# Patient Record
Sex: FEMALE | Race: WHITE | NOT HISPANIC OR LATINO | Employment: STUDENT | URBAN - METROPOLITAN AREA
[De-identification: names, ages, dates, MRNs, and addresses within clinical notes are randomized per-mention and may not be internally consistent; named-entity substitution may affect disease eponyms.]

---
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
## 2020-07-23 ENCOUNTER — TELEPHONE (OUTPATIENT)
Dept: OBSTETRICS AND GYNECOLOGY | Facility: CLINIC | Age: 20
End: 2020-07-23

## 2020-07-23 DIAGNOSIS — Z00.00 ROUTINE HEALTH MAINTENANCE: Primary | ICD-10-CM

## 2020-07-23 NOTE — TELEPHONE ENCOUNTER
Varicella IGG lab work is needed for school.  She doesn't have a current PCP, is there anyway she can get this done through us.    P.S. This is Nimisha's daughters. :)    Could you call Nimisha - [phone]

## 2020-07-28 LAB — VZV IGG SER IA-ACNC: 890 INDEX

## 2020-07-29 ENCOUNTER — TELEPHONE (OUTPATIENT)
Dept: OBSTETRICS AND GYNECOLOGY | Facility: CLINIC | Age: 20
End: 2020-07-29

## 2020-07-29 NOTE — TELEPHONE ENCOUNTER
----- Message from Donna Cantu MD sent at 7/29/2020 12:13 PM EDT -----  Please call patient and let her know that her test results show she is varicella immune    07/29/20   Pt has been informed that she is varicella immune

## 2020-10-27 ENCOUNTER — OFFICE VISIT (OUTPATIENT)
Dept: OBSTETRICS AND GYNECOLOGY | Facility: CLINIC | Age: 20
End: 2020-10-27

## 2020-10-27 VITALS
HEART RATE: 76 BPM | HEIGHT: 64 IN | WEIGHT: 147 LBS | BODY MASS INDEX: 25.1 KG/M2 | DIASTOLIC BLOOD PRESSURE: 79 MMHG | SYSTOLIC BLOOD PRESSURE: 124 MMHG

## 2020-10-27 DIAGNOSIS — Z01.419 WELL WOMAN EXAM WITH ROUTINE GYNECOLOGICAL EXAM: Primary | ICD-10-CM

## 2020-10-27 PROCEDURE — 99395 PREV VISIT EST AGE 18-39: CPT | Performed by: OBSTETRICS & GYNECOLOGY

## 2020-10-27 RX ORDER — LEVONORGESTREL / ETHINYL ESTRADIOL AND ETHINYL ESTRADIOL 150-30(84)
1 KIT ORAL DAILY
Qty: 91 EACH | Refills: 4 | Status: SHIPPED | OUTPATIENT
Start: 2020-10-27 | End: 2021-11-19 | Stop reason: SDUPTHER

## 2020-10-27 NOTE — PROGRESS NOTES
Subjective   Kary Kern is a 20 y.o. female   Chief Complaint   Patient presents with   • Annual Exam      History of Present Illness  Kary Kern is a 20 y.o.  who presents for an annual examination   Pap history:  Last pap: N/A  Prior abnormal paps: no  STDs  Sexually active: no  History of STDs: no  Has had HPV vaccine: unknown  Contraception:  OCP - for menstrual migraines; on Seasonique; desires to continue  Had one migraine episode in the last three weeks thinks due to stressful chemistry class    History of physical abuse: no, History of sexual abuse: no and History of verbal/emotional abuse: no    Screening for BRCA-   No known risk factors for BRCA    Past Medical History:   Diagnosis Date   • Migraines      Past Surgical History:   Procedure Laterality Date   • KNEE SURGERY       Social History     Tobacco Use   • Smoking status: Never Smoker   • Smokeless tobacco: Never Used   Substance Use Topics   • Alcohol use: No   • Drug use: No     Family History   Problem Relation Age of Onset   • Migraines Mother    • Hypertension Mother    • Hypertension Father    • Parkinsonism Maternal Grandmother    • Diabetes Maternal Grandmother    • Hypertension Maternal Grandmother    • Coronary artery disease Maternal Grandmother    • Heart attack Maternal Grandmother    • Deep vein thrombosis Maternal Grandmother         smoking, in her 60's   • Hypertension Maternal Grandfather    • Hypertension Paternal Grandmother    • Diabetes Paternal Grandfather    • Hypertension Paternal Grandfather    • Breast cancer Other         fifties   • Ovarian cancer Neg Hx    • Uterine cancer Neg Hx    • Colon cancer Neg Hx    • Pulmonary embolism Neg Hx      Current Outpatient Medications on File Prior to Visit   Medication Sig Dispense Refill   • Levonorgest-Eth Estrad -Day 0.15-0.03 &0.01 MG tablet Take 1 tablet by mouth Daily. 91 each 4   • naproxen sodium (ALEVE) 220 MG tablet Take 220 mg by mouth.       No current  "facility-administered medications on file prior to visit.      Allergies   Allergen Reactions   • Amoxicillin Rash    Review of Systems   Constitutional: Negative for chills, fever and unexpected weight change.   HENT: Negative for congestion, nosebleeds and sore throat.    Eyes: Negative for visual disturbance.   Respiratory: Negative for cough, chest tightness and shortness of breath.    Cardiovascular: Negative for chest pain and palpitations.   Gastrointestinal: Negative for abdominal pain, constipation, diarrhea, nausea and vomiting.   Endocrine: Negative for polyuria.   Genitourinary: Negative for difficulty urinating, dyspareunia, dysuria, frequency, genital sores, hematuria, menstrual problem, pelvic pain, urgency, vaginal bleeding, vaginal discharge and vaginal pain.   Musculoskeletal: Negative for arthralgias and joint swelling.   Skin: Negative for color change and rash.   Neurological: Negative for dizziness, light-headedness and headaches.   Hematological: Does not bruise/bleed easily.   Psychiatric/Behavioral: Negative for dysphoric mood. The patient is not nervous/anxious.        Objective    /79   Pulse 76   Ht 162.6 cm (64\")   Wt 66.7 kg (147 lb)   Breastfeeding No   BMI 25.23 kg/m²    Physical Exam   Constitutional: She is oriented to person, place, and time. She appears well-developed. No distress.   HENT:   Head: Normocephalic and atraumatic.   Neck: No tracheal deviation present. No thyromegaly present.   Cardiovascular: Normal rate, regular rhythm and normal heart sounds. Exam reveals no gallop and no friction rub.   No murmur heard.  Pulmonary/Chest: Effort normal and breath sounds normal. No respiratory distress. She has no wheezes. She has no rales. She exhibits no tenderness. Right breast exhibits no inverted nipple, no mass, no nipple discharge, no skin change and no tenderness. Left breast exhibits no inverted nipple, no mass, no nipple discharge, no skin change and no " tenderness.   Abdominal: Soft. She exhibits no distension and no mass. There is no abdominal tenderness.   Genitourinary: There is no rash, lesion or injury on the right labia. There is no rash, lesion or injury on the left labia. Left adnexum displays no mass.    Genitourinary Comments: Internal exam deferred     Musculoskeletal: Normal range of motion. No deformity.   Lymphadenopathy:     She has no cervical adenopathy.   Neurological: She is alert and oriented to person, place, and time.   Skin: Skin is warm and dry. No rash noted. She is not diaphoretic.   Psychiatric: Her behavior is normal. Judgment and thought content normal.         Assessment/Plan   Diagnoses and all orders for this visit:    1. Well woman exam with routine gynecological exam (Primary)    Well woman counseling/screening:    Cervical cancer screening:    Reports no h/o cervical dysplasia   HPV vaccination recommended    The patient is due for a pap at age 21.    Screening guidelines discussed with patient  Breast cancer screening:    Clinical breast exam recommended for age 20-39 years every 1-3 years   Mammogram recommended starting age 40, Discussed risks and benefits of earlier versus later screening   Breast self awareness encouraged  STD Screening   Declines, sexually naive  Contraception :   Desires to continue oral contraceptive pill    No side effects. Has not had a migraine recently.    Family history    does not demonstrate need for genetics referral   Healthy lifestyle counseling:   Patient was counseled on    Body mass index is 25.23 kg/m².

## 2020-11-23 ENCOUNTER — TELEPHONE (OUTPATIENT)
Dept: OBSTETRICS AND GYNECOLOGY | Facility: CLINIC | Age: 20
End: 2020-11-23

## 2020-11-23 NOTE — TELEPHONE ENCOUNTER
----- Message from Felisha Ortiz MD sent at 11/20/2020  3:09 PM EST -----  Dr. Cantu sent prescription on 10/27/20. Please check and see if they have it.  Thanks,    Erin   ----- Message -----  From: Mariam Nagy  Sent: 11/20/2020  10:09 AM EST  To: Donna Cantu MD    REFILL REQUEST DELON

## 2021-11-19 ENCOUNTER — TELEPHONE (OUTPATIENT)
Dept: OBSTETRICS AND GYNECOLOGY | Facility: CLINIC | Age: 21
End: 2021-11-19

## 2021-11-19 RX ORDER — LEVONORGESTREL / ETHINYL ESTRADIOL AND ETHINYL ESTRADIOL 150-30(84)
1 KIT ORAL DAILY
Qty: 91 EACH | Refills: 0 | Status: SHIPPED | OUTPATIENT
Start: 2021-11-19 | End: 2022-02-21 | Stop reason: SDUPTHER

## 2021-11-19 NOTE — TELEPHONE ENCOUNTER
Pepe pt called to request 91 day refill on Levonorgest-Eth Estrad 91-Day 0.15-0.03 &0.01 MG tablet, to be sent to [facility].  Annual scheduled for 1/22/21.    Pt # [phone]

## 2022-02-16 ENCOUNTER — OFFICE VISIT (OUTPATIENT)
Dept: FAMILY MEDICINE CLINIC | Facility: CLINIC | Age: 22
End: 2022-02-16

## 2022-02-16 VITALS
SYSTOLIC BLOOD PRESSURE: 118 MMHG | HEIGHT: 64 IN | BODY MASS INDEX: 24.75 KG/M2 | WEIGHT: 145 LBS | DIASTOLIC BLOOD PRESSURE: 64 MMHG | HEART RATE: 83 BPM | OXYGEN SATURATION: 98 %

## 2022-02-16 DIAGNOSIS — R53.83 OTHER FATIGUE: ICD-10-CM

## 2022-02-16 DIAGNOSIS — Z00.00 ANNUAL PHYSICAL EXAM: Primary | ICD-10-CM

## 2022-02-16 DIAGNOSIS — Z11.59 NEED FOR HEPATITIS C SCREENING TEST: ICD-10-CM

## 2022-02-16 PROCEDURE — 99385 PREV VISIT NEW AGE 18-39: CPT

## 2022-02-16 NOTE — PROGRESS NOTES
Subjective   Kary Kern is a 21 y.o. female. Presents today as a New patient here to establish care and for annual check up     Chief Complaint   Patient presents with   • Annual Exam     new patient estab care and school       History of Present Illness    Previous PCP: Pediatrician Dr Jovi montoya  Significant medical hx: none   Significant family hx: Parents (HTN); Grandmother (DM2); Grandfather (heart disease, DM2)    Denies Smoking, alcohol, drug use  No Hx STD-   Currently Sexually active- monogamous relationship - on OCP- doing well  Pap: has not had one. Scheduled for April.     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   Routine exercise. Goes to gym    Fatigue: States has been more fatigued than usual. No associated symptoms. Has been eating well and exercising. Started vitamin B 12 to see if helps. Would like lab work.    No other concerns today.   Review of Systems   Constitutional: Positive for fatigue. Negative for appetite change, chills and fever.   Eyes: Negative for visual disturbance.   Respiratory: Negative.    Cardiovascular: Negative.    Endocrine: Negative.    Psychiatric/Behavioral: Negative for sleep disturbance.       Patient Active Problem List   Diagnosis   • Encounter for gynecological examination without abnormal finding   • Patellar tendinitis of both knees     Past Medical History:   Diagnosis Date   • Migraines      Past Surgical History:   Procedure Laterality Date   • KNEE SURGERY       Family History   Problem Relation Age of Onset   • Migraines Mother    • Hypertension Mother    • Hypertension Father    • Parkinsonism Maternal Grandmother    • Diabetes Maternal Grandmother    • Hypertension Maternal Grandmother    • Coronary artery disease Maternal Grandmother    • Heart attack Maternal Grandmother    • Deep vein thrombosis Maternal Grandmother         smoking, in her 60's   •  "Hypertension Maternal Grandfather    • Hypertension Paternal Grandmother    • Diabetes Paternal Grandfather    • Hypertension Paternal Grandfather    • Breast cancer Other         fifties   • Ovarian cancer Neg Hx    • Uterine cancer Neg Hx    • Colon cancer Neg Hx    • Pulmonary embolism Neg Hx      Social History     Socioeconomic History   • Marital status: Single   Tobacco Use   • Smoking status: Never Smoker   • Smokeless tobacco: Never Used   Substance and Sexual Activity   • Alcohol use: No   • Drug use: No   • Sexual activity: Never     Birth control/protection: None, OCP       Allergies   Allergen Reactions   • Amoxicillin Rash       Current Outpatient Medications on File Prior to Visit   Medication Sig Dispense Refill   • Levonorgest-Eth Estrad 91-Day 0.15-0.03 &0.01 MG tablet Take 1 tablet by mouth Daily. 91 each 0   • spironolactone (ALDACTONE) 25 MG tablet Take 3 tablets by mouth every night at bedtime with food 90 tablet 1   • [DISCONTINUED] naproxen sodium (ALEVE) 220 MG tablet Take 220 mg by mouth.     • [DISCONTINUED] spironolactone (ALDACTONE) 25 MG tablet Take 3 tablets by mouth every night with food 90 tablet 1   • [DISCONTINUED] spironolactone (ALDACTONE) 25 MG tablet Take 3 tablets by mouth every night at bedtime. 90 tablet 5     No current facility-administered medications on file prior to visit.       Objective   Vitals:    02/16/22 1540   BP: 118/64   Pulse: 83   SpO2: 98%   Weight: 65.8 kg (145 lb)   Height: 162.6 cm (64\")   PainSc: 0-No pain     Body mass index is 24.89 kg/m².  Physical Exam  Constitutional:       Appearance: Normal appearance.   Eyes:      Extraocular Movements: Extraocular movements intact.      Conjunctiva/sclera: Conjunctivae normal.      Pupils: Pupils are equal, round, and reactive to light.   Neck:      Thyroid: No thyroid mass, thyromegaly or thyroid tenderness.      Vascular: No carotid bruit.   Cardiovascular:      Rate and Rhythm: Normal rate and regular rhythm. "      Pulses: Normal pulses.      Heart sounds: Normal heart sounds, S1 normal and S2 normal. No murmur heard.      Pulmonary:      Effort: Pulmonary effort is normal. No respiratory distress.      Breath sounds: Normal breath sounds.   Musculoskeletal:      Cervical back: Neck supple. No tenderness.      Right lower leg: No edema.      Left lower leg: No edema.   Lymphadenopathy:      Cervical: No cervical adenopathy.   Neurological:      General: No focal deficit present.      Mental Status: She is alert and oriented to person, place, and time.   Psychiatric:         Attention and Perception: Attention normal.         Mood and Affect: Mood normal.         Speech: Speech normal.         Behavior: Behavior normal.         Thought Content: Thought content normal.         Cognition and Memory: Cognition normal.         Judgment: Judgment normal.         Assessment/Plan   Diagnoses and all orders for this visit:    1. Annual physical exam (Primary)  -     CBC & Differential  -     Comprehensive Metabolic Panel  -     Hepatitis C Antibody  -     TSH  -     Tdap due- plans to get tomorrow at work- work in [facility]        -     Eat a healthy diet and exercise routinely. Avoid smoking/alcohol and drugs. Use seatbelt 100% of time. Advised dental exam every 6-12 months.     2. Need for hepatitis C screening test  -     Hepatitis C Antibody    3. Other fatigue  -     Vitamin D 25 Hydroxy  -     Vitamin B12  -     TSH         -Follow up: 12 months, sooner as needed.    - Pt agrees with plan of care and states no further concerns or questions today      This document is intended for medical expert use only. Reading of this document by patients and/or patient's family without participating medical staff guidance may result in misinterpretation and unintended morbidity.  Any interpretation of such data is the responsibility of the patient and/or family member responsible for the patient in concert with their primary or specialist  providers, not to be left for sources of online searches such as [employer], [employer] or similar queries. Relying on these approaches to knowledge may result in misinterpretation, misguided goals of care and even death should patients or family members try recommendations outside of the realm of professional medical care in a supervised way.     Please allow 3-5 business days for recommendations based on new results     Go to the ER for any possible lifethreatening symptoms such as chest pain or shortness of air.      I personally spent 20 minutes with this patient, preparing for the visit, reviewing tests, obtaining and/or reviewing a separately obtained history, performing a medically appropriate examination and/or evaluation , counseling and educating the patient/family/caregiver, ordering medications, tests, or procedures, documenting information in the medical record and independently interpreting results.

## 2022-02-18 LAB
25(OH)D3+25(OH)D2 SERPL-MCNC: 31.3 NG/ML (ref 30–100)
ALBUMIN SERPL-MCNC: 4.4 G/DL (ref 3.9–5)
ALBUMIN/GLOB SERPL: 1.8 {RATIO} (ref 1.2–2.2)
ALP SERPL-CCNC: 47 IU/L (ref 44–121)
ALT SERPL-CCNC: 19 IU/L (ref 0–32)
AST SERPL-CCNC: 26 IU/L (ref 0–40)
BASOPHILS # BLD AUTO: 0.1 X10E3/UL (ref 0–0.2)
BASOPHILS NFR BLD AUTO: 1 %
BILIRUB SERPL-MCNC: 0.3 MG/DL (ref 0–1.2)
BUN SERPL-MCNC: 12 MG/DL (ref 6–20)
BUN/CREAT SERPL: 15 (ref 9–23)
CALCIUM SERPL-MCNC: 9.3 MG/DL (ref 8.7–10.2)
CHLORIDE SERPL-SCNC: 100 MMOL/L (ref 96–106)
CO2 SERPL-SCNC: 22 MMOL/L (ref 20–29)
CREAT SERPL-MCNC: 0.82 MG/DL (ref 0.57–1)
EOSINOPHIL # BLD AUTO: 0.1 X10E3/UL (ref 0–0.4)
EOSINOPHIL NFR BLD AUTO: 1 %
ERYTHROCYTE [DISTWIDTH] IN BLOOD BY AUTOMATED COUNT: 11.7 % (ref 11.7–15.4)
GLOBULIN SER CALC-MCNC: 2.4 G/DL (ref 1.5–4.5)
GLUCOSE SERPL-MCNC: 121 MG/DL (ref 65–99)
HCT VFR BLD AUTO: 41.5 % (ref 34–46.6)
HCV AB S/CO SERPL IA: <0.1 S/CO RATIO (ref 0–0.9)
HGB BLD-MCNC: 14.2 G/DL (ref 11.1–15.9)
IMM GRANULOCYTES # BLD AUTO: 0 X10E3/UL (ref 0–0.1)
IMM GRANULOCYTES NFR BLD AUTO: 0 %
LYMPHOCYTES # BLD AUTO: 2.7 X10E3/UL (ref 0.7–3.1)
LYMPHOCYTES NFR BLD AUTO: 34 %
MCH RBC QN AUTO: 30.7 PG (ref 26.6–33)
MCHC RBC AUTO-ENTMCNC: 34.2 G/DL (ref 31.5–35.7)
MCV RBC AUTO: 90 FL (ref 79–97)
MONOCYTES # BLD AUTO: 0.4 X10E3/UL (ref 0.1–0.9)
MONOCYTES NFR BLD AUTO: 5 %
NEUTROPHILS # BLD AUTO: 4.7 X10E3/UL (ref 1.4–7)
NEUTROPHILS NFR BLD AUTO: 59 %
PLATELET # BLD AUTO: 336 X10E3/UL (ref 150–450)
POTASSIUM SERPL-SCNC: 4.2 MMOL/L (ref 3.5–5.2)
PROT SERPL-MCNC: 6.8 G/DL (ref 6–8.5)
RBC # BLD AUTO: 4.63 X10E6/UL (ref 3.77–5.28)
SODIUM SERPL-SCNC: 139 MMOL/L (ref 134–144)
TSH SERPL DL<=0.005 MIU/L-ACNC: 1.19 UIU/ML (ref 0.45–4.5)
VIT B12 SERPL-MCNC: 401 PG/ML (ref 232–1245)
WBC # BLD AUTO: 8 X10E3/UL (ref 3.4–10.8)

## 2022-02-18 NOTE — PROGRESS NOTES
Please inform pt of normal lab results. Blood sugar mildly elevated but could be normal if she ate before coming in.     Repeat in 1 year.

## 2022-02-21 RX ORDER — LEVONORGESTREL / ETHINYL ESTRADIOL AND ETHINYL ESTRADIOL 150-30(84)
1 KIT ORAL DAILY
Qty: 91 EACH | Refills: 0 | Status: CANCELLED | OUTPATIENT
Start: 2022-02-21

## 2022-02-21 RX ORDER — LEVONORGESTREL / ETHINYL ESTRADIOL AND ETHINYL ESTRADIOL 150-30(84)
1 KIT ORAL DAILY
Qty: 91 EACH | Refills: 0 | Status: SHIPPED | OUTPATIENT
Start: 2022-02-21 | End: 2022-05-06 | Stop reason: SDUPTHER

## 2022-02-21 NOTE — TELEPHONE ENCOUNTER
Med Refill. AE 05/06/2022 with Dr Cantu. [facility] pharmacy on file. Thank you.

## 2022-04-18 ENCOUNTER — OFFICE VISIT (OUTPATIENT)
Dept: FAMILY MEDICINE CLINIC | Facility: CLINIC | Age: 22
End: 2022-04-18

## 2022-04-18 VITALS
OXYGEN SATURATION: 99 % | WEIGHT: 146 LBS | DIASTOLIC BLOOD PRESSURE: 66 MMHG | BODY MASS INDEX: 24.92 KG/M2 | SYSTOLIC BLOOD PRESSURE: 106 MMHG | HEART RATE: 84 BPM | HEIGHT: 64 IN

## 2022-04-18 DIAGNOSIS — Z02.0 SCHOOL PHYSICAL EXAM: Primary | ICD-10-CM

## 2022-04-18 PROCEDURE — 99213 OFFICE O/P EST LOW 20 MIN: CPT

## 2022-04-18 NOTE — PROGRESS NOTES
"Subjective   Kary Kern is a 21 y.o. female. Who presents for a school physical    History of Present Illness     Need for School physical- forms filled  Going to [place] schools     Eye exam done in march- needs eye wear  Denies concerns.     Needs Tb test- wants blood test  The following portions of the patient's history were reviewed and updated as appropriate: allergies, current medications, past family history, past medical history, past social history and past surgical history.    Review of Systems   Constitutional: Negative for chills, fever and unexpected weight loss.   Eyes: Negative for blurred vision and visual disturbance.   Respiratory: Negative.    Cardiovascular: Negative.    Gastrointestinal: Negative for constipation and diarrhea.   Genitourinary: Negative for difficulty urinating.   Psychiatric/Behavioral: Negative for sleep disturbance.       Objective    /66   Pulse 84   Ht 162.6 cm (64\")   Wt 66.2 kg (146 lb)   LMP 02/17/2022   SpO2 99%   BMI 25.06 kg/m²     Physical Exam  Constitutional:       Appearance: Normal appearance. She is not ill-appearing.   HENT:      Head: Normocephalic and atraumatic.      Right Ear: Tympanic membrane, ear canal and external ear normal. There is no impacted cerumen.      Left Ear: Tympanic membrane, ear canal and external ear normal. There is no impacted cerumen.      Mouth/Throat:      Mouth: Mucous membranes are moist.      Pharynx: No oropharyngeal exudate or posterior oropharyngeal erythema.   Eyes:      Conjunctiva/sclera: Conjunctivae normal.      Pupils: Pupils are equal, round, and reactive to light.   Neck:      Thyroid: No thyroid mass, thyromegaly or thyroid tenderness.   Cardiovascular:      Rate and Rhythm: Normal rate and regular rhythm.      Pulses: Normal pulses.      Heart sounds: Normal heart sounds, S1 normal and S2 normal. No murmur heard.  Pulmonary:      Effort: Pulmonary effort is normal. No bradypnea or respiratory distress.      " Breath sounds: Normal breath sounds and air entry. No decreased breath sounds, wheezing or rhonchi.   Abdominal:      General: Bowel sounds are normal. There is no distension.      Palpations: Abdomen is soft. There is no mass.      Tenderness: There is no abdominal tenderness.   Musculoskeletal:      Cervical back: Neck supple.      Right lower leg: No edema.      Left lower leg: No edema.   Lymphadenopathy:      Cervical: No cervical adenopathy.   Neurological:      General: No focal deficit present.      Mental Status: She is alert and oriented to person, place, and time.      Cranial Nerves: No cranial nerve deficit, dysarthria or facial asymmetry.      Motor: No tremor.      Gait: Gait is intact.   Psychiatric:         Attention and Perception: Attention normal.         Mood and Affect: Mood normal.         Speech: Speech normal.         Behavior: Behavior normal.         Thought Content: Thought content normal.         Cognition and Memory: Cognition normal.         Judgment: Judgment normal.       Assessment/Plan   Diagnoses and all orders for this visit:    1. School physical exam (Primary)  -     [employer] TB Gold    Exam unremarkable. Had eye exam recently, pt will request records and attach to forms for school. Will get TB gold as wants blood test for TB.   Denies concerns today. Forms signed and given to pt.     RTC or call if questions/concerns.      - Pt agrees with plan of care and states no further concerns or questions today    This document is intended for medical expert use only. Reading of this document by patients and/or patient's family without participating medical staff guidance may result in misinterpretation and unintended morbidity.  Any interpretation of such data is the responsibility of the patient and/or family member responsible for the patient in concert with their primary or specialist providers, not to be left for sources of online searches such as [employer], [employer] or similar  queries. Relying on these approaches to knowledge may result in misinterpretation, misguided goals of care and even death should patients or family members try recommendations outside of the realm of professional medical care in a supervised way.     Please allow 3-5 business days for recommendations based on new results     Go to the ER for any possible lifethreatening symptoms such as chest pain or shortness of air.      I personally spent 20 minutes with this patient, preparing for the visit, reviewing tests, obtaining and/or reviewing a separately obtained history, performing a medically appropriate examination and/or evaluation , counseling and educating the patient/family/caregiver, ordering medications, tests, or procedures, documenting information in the medical record and independently interpreting results.

## 2022-04-20 LAB
GAMMA INTERFERON BACKGROUND BLD IA-ACNC: 0.01 IU/ML
M TB IFN-G BLD-IMP: NEGATIVE
M TB IFN-G CD4+ BCKGRND COR BLD-ACNC: 0.02 IU/ML
M TB IFN-G CD4+CD8+ BCKGRND COR BLD-ACNC: 0.03 IU/ML
MITOGEN IGNF BLD-ACNC: >10 IU/ML
QUANTIFERON INCUBATION: NORMAL
SERVICE CMNT-IMP: NORMAL

## 2022-05-06 ENCOUNTER — OFFICE VISIT (OUTPATIENT)
Dept: OBSTETRICS AND GYNECOLOGY | Facility: CLINIC | Age: 22
End: 2022-05-06

## 2022-05-06 VITALS
WEIGHT: 146.2 LBS | BODY MASS INDEX: 24.96 KG/M2 | SYSTOLIC BLOOD PRESSURE: 123 MMHG | HEIGHT: 64 IN | HEART RATE: 77 BPM | DIASTOLIC BLOOD PRESSURE: 78 MMHG

## 2022-05-06 DIAGNOSIS — Z01.419 WELL WOMAN EXAM WITH ROUTINE GYNECOLOGICAL EXAM: Primary | ICD-10-CM

## 2022-05-06 PROCEDURE — 99395 PREV VISIT EST AGE 18-39: CPT | Performed by: OBSTETRICS & GYNECOLOGY

## 2022-05-06 RX ORDER — LEVONORGESTREL / ETHINYL ESTRADIOL AND ETHINYL ESTRADIOL 150-30(84)
1 KIT ORAL DAILY
Qty: 91 EACH | Refills: 4 | Status: SHIPPED | OUTPATIENT
Start: 2022-05-06

## 2022-05-06 NOTE — PROGRESS NOTES
Subjective   Kary Kern is a 21 y.o. female   Chief Complaint   Patient presents with   • Annual Exam     Pt presents today for annual exam. Last annual- 10/27/2020      History of Present Illness  Kary Kern is a 21 y.o.  who presents for an annual examination  Reports she is one week before her period and did feel more anxiety this week.  This is the first time it has happened.  She got engaged recently    Pap history:  Last pap: N/A  Prior abnormal paps: no  STDs  Sexually active: no  History of STDs: no  Has had HPV vaccine: unknown  Contraception:  OCP - for menstrual migraines; on Seasonique; desires to continue      History of physical abuse: no, History of sexual abuse: no and History of verbal/emotional abuse: no    Screening for BRCA-   No known risk factors for BRCA    Past Medical History:   Diagnosis Date   • Migraines      Past Surgical History:   Procedure Laterality Date   • KNEE SURGERY       Social History     Tobacco Use   • Smoking status: Never Smoker   • Smokeless tobacco: Never Used   Substance Use Topics   • Alcohol use: No   • Drug use: No     Family History   Problem Relation Age of Onset   • Migraines Mother    • Hypertension Mother    • Hypertension Father    • Parkinsonism Maternal Grandmother    • Diabetes Maternal Grandmother    • Hypertension Maternal Grandmother    • Coronary artery disease Maternal Grandmother    • Heart attack Maternal Grandmother    • Deep vein thrombosis Maternal Grandmother         smoking, in her 60's   • Hypertension Maternal Grandfather    • Hypertension Paternal Grandmother    • Diabetes Paternal Grandfather    • Hypertension Paternal Grandfather    • Breast cancer Other         fifties   • Ovarian cancer Neg Hx    • Uterine cancer Neg Hx    • Colon cancer Neg Hx    • Pulmonary embolism Neg Hx      Current Outpatient Medications on File Prior to Visit   Medication Sig Dispense Refill   • Levonorgest-Eth Estrad 91-Day (Ashlyna) 0.15-0.03 &0.01  "MG tablet Take 1 tablet by mouth Daily. 91 each 0   • spironolactone (ALDACTONE) 25 MG tablet Take 3 tablets by mouth every night at bedtime with food 90 tablet 1     No current facility-administered medications on file prior to visit.     Allergies   Allergen Reactions   • Amoxicillin Rash    Review of Systems   Constitutional: Negative for chills, fever and unexpected weight change.   HENT: Negative for congestion, nosebleeds and sore throat.    Eyes: Negative for visual disturbance.   Respiratory: Negative for cough, chest tightness and shortness of breath.    Cardiovascular: Negative for chest pain and palpitations.   Gastrointestinal: Negative for abdominal pain, constipation, diarrhea, nausea and vomiting.   Endocrine: Negative for polyuria.   Genitourinary: Negative for difficulty urinating, dyspareunia, dysuria, frequency, genital sores, hematuria, menstrual problem, pelvic pain, urgency, vaginal bleeding, vaginal discharge and vaginal pain.   Musculoskeletal: Negative for arthralgias and joint swelling.   Skin: Negative for color change and rash.   Neurological: Negative for dizziness, light-headedness and headaches.   Hematological: Does not bruise/bleed easily.   Psychiatric/Behavioral: Negative for dysphoric mood. The patient is not nervous/anxious.        Objective    /78   Pulse 77   Ht 162.6 cm (64.02\")   Wt 66.3 kg (146 lb 3.2 oz)   BMI 25.08 kg/m²    Physical Exam   Constitutional: She is oriented to person, place, and time. She appears well-developed. No distress.   HENT:   Head: Normocephalic and atraumatic.   Neck: No tracheal deviation present. No thyromegaly present.   Cardiovascular: Normal rate, regular rhythm and normal heart sounds. Exam reveals no gallop and no friction rub.   No murmur heard.  Pulmonary/Chest: Effort normal and breath sounds normal. No respiratory distress. She has no wheezes. She has no rales. She exhibits no tenderness. Right breast exhibits no inverted " nipple, no mass, no nipple discharge, no skin change and no tenderness. Left breast exhibits no inverted nipple, no mass, no nipple discharge, no skin change and no tenderness.   Abdominal: Soft. She exhibits no distension and no mass. There is no abdominal tenderness.   Genitourinary: There is no rash, lesion or injury on the right labia. There is no rash, lesion or injury on the left labia. Left adnexum displays no mass.    Genitourinary Comments: Internal exam deferred     Musculoskeletal: Normal range of motion. No deformity.   Lymphadenopathy:     She has no cervical adenopathy.   Neurological: She is alert and oriented to person, place, and time.   Skin: Skin is warm and dry. No rash noted. She is not diaphoretic.   Psychiatric: Her behavior is normal. Judgment and thought content normal.         Assessment/Plan   Diagnoses and all orders for this visit:    1. Well woman exam with routine gynecological exam (Primary)    Well woman counseling/screening:    Cervical cancer screening:    Reports no h/o cervical dysplasia   HPV vaccination recommended    The patient is due for a pap 05/06/22     Screening guidelines discussed with patient  Breast cancer screening:    Clinical breast exam recommended for age 20-39 years every 1-3 years   Mammogram recommended starting age 40, Discussed risks and benefits of earlier versus later screening   Breast self awareness encouraged  STD Screening   Agrees to swab  Contraception :   Desires to continue oral contraceptive pill    Reports that she has had some mood changes - if continue will discuss alternative contraception or SSRI around time of menses  Family history    does not demonstrate need for genetics referral   Healthy lifestyle counseling:   Patient was counseled on    Body mass index is 25.08 kg/m².

## 2022-05-09 LAB
C TRACH RRNA SPEC QL NAA+PROBE: NEGATIVE
N GONORRHOEA RRNA SPEC QL NAA+PROBE: NEGATIVE
T VAGINALIS RRNA SPEC QL NAA+PROBE: NEGATIVE

## 2022-05-11 LAB
CONV .: NORMAL
CYTOLOGIST CVX/VAG CYTO: NORMAL
CYTOLOGY CVX/VAG DOC CYTO: NORMAL
CYTOLOGY CVX/VAG DOC THIN PREP: NORMAL
DX ICD CODE: NORMAL
HIV 1 & 2 AB SER-IMP: NORMAL
OTHER STN SPEC: NORMAL
STAT OF ADQ CVX/VAG CYTO-IMP: NORMAL

## 2022-08-26 ENCOUNTER — OFFICE VISIT (OUTPATIENT)
Dept: FAMILY MEDICINE CLINIC | Facility: CLINIC | Age: 22
End: 2022-08-26

## 2022-08-26 VITALS
WEIGHT: 150 LBS | DIASTOLIC BLOOD PRESSURE: 66 MMHG | HEART RATE: 78 BPM | OXYGEN SATURATION: 99 % | BODY MASS INDEX: 25.61 KG/M2 | SYSTOLIC BLOOD PRESSURE: 122 MMHG | HEIGHT: 64 IN

## 2022-08-26 DIAGNOSIS — F41.9 ANXIETY: Primary | ICD-10-CM

## 2022-08-26 DIAGNOSIS — Z11.59 NEED FOR HEPATITIS B SCREENING TEST: ICD-10-CM

## 2022-08-26 PROCEDURE — 99213 OFFICE O/P EST LOW 20 MIN: CPT

## 2022-08-26 RX ORDER — BUSPIRONE HYDROCHLORIDE 5 MG/1
5 TABLET ORAL 3 TIMES DAILY PRN
Qty: 90 TABLET | Refills: 0 | Status: SHIPPED | OUTPATIENT
Start: 2022-08-26 | End: 2022-12-29

## 2022-08-26 NOTE — PROGRESS NOTES
"Subjective   Kary Kern is a 22 y.o. female. Who presents with complaints of anxiety      History of Present Illness     Anxiety    Sweaty hands, feels heart racing, difficulty concentrating, scattered thoughts, feeling overwehlemd.   Started [employer] school in July. Symptoms started soon after  Symptoms most days but worse when close to exams and quizzes.   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     Has been taking some time during day to relax and organize but not helping.    No depression symptoms. Sometimes unmotivated but thinks mostly because she feels tired. No thoughts of self harm. appetitive and sleep ok    Needs hep B titer.  The following portions of the patient's history were reviewed and updated as appropriate: allergies, current medications, past family history, past medical history, past social history and past surgical history.    Review of Systems   Constitutional: Positive for fatigue. Negative for appetite change.   Respiratory: Negative.    Cardiovascular: Negative.    Psychiatric/Behavioral: Positive for decreased concentration and stress. Negative for self-injury, sleep disturbance, suicidal ideas and depressed mood. The patient is nervous/anxious.        Objective    /66   Pulse 78   Ht 162.6 cm (64\")   Wt 68 kg (150 lb)   LMP 08/19/2022   SpO2 99%   BMI 25.75 kg/m²     Physical Exam  Constitutional:       Appearance: Normal appearance. She is not ill-appearing.   Pulmonary:      Effort: Pulmonary effort is normal. No respiratory distress.   Neurological:      General: No focal deficit present.      Mental Status: She is alert and oriented to person, place, and time.      Cranial Nerves: No dysarthria.      Gait: Gait is intact.   Psychiatric:         Attention and Perception: Attention normal.         Mood and Affect: Mood normal.         Speech: Speech normal.         Behavior: Behavior normal.         Thought Content: Thought content normal.         Cognition and " Memory: Cognition normal.         Judgment: Judgment normal.       Assessment & Plan   Diagnoses and all orders for this visit:    1. Anxiety (Primary)  -     busPIRone (BUSPAR) 5 MG tablet; Take 1 tablet by mouth 3 (Three) Times a Day As Needed (anxiety).  Dispense: 90 tablet; Refill: 0  -      Discussed symptoms. Think mostly related to high stress. Advised self care and planning- time management important to avoid falling behind which increases stress and anxiety. Set some time aside for exercise. Say no to events that are not as necessary to attend. Good sleep and nutritious diet. Stay hydrated.  -      Will give buspar to use ad needed. Use and common SE discussed. No thoughts of self harm.   -     If no improvement in 1 month or so RTC. Consider adding SSRI vs Wellbutrin as may help with concentration - call if questions/concerns.     2. Need for hepatitis B screening test  -     Hepatitis B Surface Antibody    Follow up in 1 month if no improvement, sooner as needed.      - Pt agrees with plan of care and states no further concerns or questions today    This document is intended for medical expert use only. Reading of this document by patients and/or patient's family without participating medical staff guidance may result in misinterpretation and unintended morbidity.  Any interpretation of such data is the responsibility of the patient and/or family member responsible for the patient in concert with their primary or specialist providers, not to be left for sources of online searches such as idio, [employer] or similar queries. Relying on these approaches to knowledge may result in misinterpretation, misguided goals of care and even death should patients or family members try recommendations outside of the realm of professional medical care in a supervised way.     Please allow 3-5 business days for recommendations based on new results     Go to the ER for any possible lifethreatening symptoms such as chest pain  or shortness of air.      I personally spent 25 minutes with this patient, preparing for the visit, reviewing tests, obtaining and/or reviewing a separately obtained history, performing a medically appropriate examination and/or evaluation , counseling and educating the patient/family/caregiver, ordering medications, tests, or procedures, documenting information in the medical record and independently interpreting results.

## 2022-08-27 LAB — HBV SURFACE AB SER QL: NON REACTIVE

## 2022-11-23 ENCOUNTER — TELEPHONE (OUTPATIENT)
Dept: FAMILY MEDICINE CLINIC | Facility: CLINIC | Age: 22
End: 2022-11-23

## 2022-11-23 NOTE — TELEPHONE ENCOUNTER
Caller: Erlin Kary    Relationship: Self    Best call back number: [phone]    What is the best time to reach you: ANYTIME     Who are you requesting to speak with (clinical staff, provider,  specific staff member): CLINICAL     What was the call regarding: PATIENT SENT A [employer] MESSAGE ON 11.16.22. 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 PATIENT DID THE FASTING GLUCOSE TEST THEN DRANK SOME ORANGE JUICE AND WAITED FOR 15 MINUTES THEN TESTED AGAIN. PATIENT STATES HER FASTING  BUT THE SECOND TEST SHOWED IT DROPPED TO 79. PATIENT WAS UNSURE WHAT THIS COULD BE BECAUSE SHE IS NOT EXPERIENCING SYMPTOMS. PATIENT IS WONDERING IF SHE SHOULD COME IN FOR GLUCOSE TESTING OR FOCUS ON DIET AND EXERCISE UNTIL HER NEXT APPOINTMENT IN February.     Do you require a callback: YES

## 2022-11-28 NOTE — TELEPHONE ENCOUNTER
Please apologize to pt about late response. I was out of town and her message was not in my basket when I returned.   If no symptoms should be ok to wait until annual

## 2022-12-08 ENCOUNTER — TELEPHONE (OUTPATIENT)
Dept: FAMILY MEDICINE CLINIC | Facility: CLINIC | Age: 22
End: 2022-12-08

## 2022-12-08 NOTE — TELEPHONE ENCOUNTER
----- Message from Mariam Dailey MA sent at 11/17/2022  9:57 AM EST -----  Regarding: FW: Blood Glucose   Contact: [phone]    ----- Message -----  From: Kary Kern  Sent: 11/16/2022   4:20 PM EST  To: [facility]  Subject: Blood Glucose                                    Hermes Tamayo, this is Kary Erlin a 22 year old female. I am in [employer] school and we did a fasting glucose test in class and then drank some orange juice and waited 15 minutes and then tested again. My fasting was 134 which was concerning but then after the orange juice it dropped to 79. I believe this is reactive hypoglycemia but I haven’t had any of the symptoms that typically come with it. My question is should I come in for some glucose testing or just focus on diet and exercise and check it when I come in for an annual visit in February?     Thanks,  Kary Kern

## 2022-12-29 ENCOUNTER — OFFICE VISIT (OUTPATIENT)
Dept: FAMILY MEDICINE CLINIC | Facility: CLINIC | Age: 22
End: 2022-12-29

## 2022-12-29 VITALS
RESPIRATION RATE: 16 BRPM | BODY MASS INDEX: 24.92 KG/M2 | DIASTOLIC BLOOD PRESSURE: 64 MMHG | SYSTOLIC BLOOD PRESSURE: 122 MMHG | OXYGEN SATURATION: 99 % | HEART RATE: 100 BPM | WEIGHT: 146 LBS | HEIGHT: 64 IN | TEMPERATURE: 97.1 F

## 2022-12-29 DIAGNOSIS — Z83.3 FAMILY HISTORY OF DIABETES MELLITUS IN GRANDMOTHER: ICD-10-CM

## 2022-12-29 DIAGNOSIS — R73.9 HYPERGLYCEMIA: ICD-10-CM

## 2022-12-29 DIAGNOSIS — Z11.59 NEED FOR HEPATITIS B SCREENING TEST: Primary | ICD-10-CM

## 2022-12-29 DIAGNOSIS — F41.9 ANXIETY: ICD-10-CM

## 2022-12-29 PROCEDURE — 99213 OFFICE O/P EST LOW 20 MIN: CPT

## 2022-12-29 RX ORDER — BUSPIRONE HYDROCHLORIDE 5 MG/1
5 TABLET ORAL NIGHTLY PRN
Qty: 90 TABLET | Refills: 0 | Status: SHIPPED | OUTPATIENT
Start: 2022-12-29 | End: 2023-04-06 | Stop reason: SDUPTHER

## 2022-12-29 NOTE — PROGRESS NOTES
"Answers for HPI/ROS submitted by the patient on 12/29/2022  What is the primary reason for your visit?: Physical    Subjective   Kary Kern is a 22 y.o. female. Who presents to follow up on anxiety and repeat hep B titer      History of Present Illness     Doing well on buspar- taking at night. Sleeping well. No new symptoms . No concerns. No thoughts of self harm. doing well at school    Had hep B vaccines in past. Titer negative last time. Got booster and was told by teacher to get titer again.     Hyperglycemia at school. 136 fasting, drank OJ dropped to 72.   Shakiness if doesn't eat for long periods. No polyuria, polydipsia, polyphagia. No vision changes.   Hx in grandmother. Mother prediabetic.     The following portions of the patient's history were reviewed and updated as appropriate: problem list.    Review of Systems   Constitutional: Negative for fatigue, fever and unexpected weight loss.   Eyes: Negative for visual disturbance.   Respiratory: Negative.    Cardiovascular: Negative.    Endocrine: Negative.    Neurological: Negative for dizziness, light-headedness and headache.   Psychiatric/Behavioral: Negative for self-injury, sleep disturbance, suicidal ideas and depressed mood. The patient is not nervous/anxious.        Objective    /64   Pulse 100   Temp 97.1 °F (36.2 °C) (Temporal)   Resp 16   Ht 162.6 cm (64\")   Wt 66.2 kg (146 lb)   LMP 11/29/2022 (Approximate)   SpO2 99%   BMI 25.06 kg/m²     Physical Exam  Constitutional:       Appearance: Normal appearance. She is not ill-appearing.   Cardiovascular:      Rate and Rhythm: Normal rate and regular rhythm.      Pulses: Normal pulses.      Heart sounds: Normal heart sounds, S1 normal and S2 normal. No murmur heard.  Pulmonary:      Effort: Pulmonary effort is normal. No respiratory distress.   Neurological:      General: No focal deficit present.      Mental Status: She is alert and oriented to person, place, and time.      Cranial " Nerves: No dysarthria.      Gait: Gait is intact.   Psychiatric:         Attention and Perception: Attention normal.         Mood and Affect: Mood normal.         Speech: Speech normal.         Behavior: Behavior normal.         Thought Content: Thought content normal.         Cognition and Memory: Cognition normal.         Judgment: Judgment normal.           Assessment & Plan   Diagnoses and all orders for this visit:    1. Need for hepatitis B screening test (Primary)  -     Hepatitis B Surf Antibody Quant    2. Anxiety  -     busPIRone (BUSPAR) 5 MG tablet; Take 1 tablet by mouth At Night As Needed (anxiety). 1 tab po qhs  Dispense: 90 tablet; Refill: 0  -     Stable on current tx. Dealing better with school stress. Takes buspar at night so will change prescription. Cont same tx. Call or RTC as needed.  Denies thoughts of self harm- if these occur seek help immediately    3. Hyperglycemia  -     Hemoglobin A1c    4. Family history of diabetes mellitus in grandmother  -     Hemoglobin A1c      Will advise further once lab results available.   Follow up: 1 yr sooner as needed. Call if questions         - Pt agrees with plan of care and states no further concerns or questions today    This document is intended for medical expert use only. Reading of this document by patients and/or patient's family without participating medical staff guidance may result in misinterpretation and unintended morbidity.  Any interpretation of such data is the responsibility of the patient and/or family member responsible for the patient in concert with their primary or specialist providers, not to be left for sources of online searches such as [employer], [employer] or similar queries. Relying on these approaches to knowledge may result in misinterpretation, misguided goals of care and even death should patients or family members try recommendations outside of the realm of professional medical care in a supervised way.     Please allow 3-5  business days for recommendations based on new results     Go to the ER for any possible lifethreatening symptoms such as chest pain or shortness of air.      I personally spent 15 minutes with this patient, preparing for the visit, reviewing tests, obtaining and/or reviewing a separately obtained history, performing a medically appropriate examination and/or evaluation , counseling and educating the patient/family/caregiver, ordering medications, tests, or procedures, documenting information in the medical record and independently interpreting results.

## 2022-12-30 LAB
HBA1C MFR BLD: 4.9 % (ref 4.8–5.6)
HBV SURFACE AB SER-ACNC: >1000 MIU/ML

## 2022-12-30 NOTE — PROGRESS NOTES
Please inform pt that her Hep B titer shows immunity.     A1c within normal limits. No diabetes.     Follow up as needed

## 2023-04-06 ENCOUNTER — TELEPHONE (OUTPATIENT)
Dept: FAMILY MEDICINE CLINIC | Facility: CLINIC | Age: 23
End: 2023-04-06

## 2023-04-06 DIAGNOSIS — F41.9 ANXIETY: ICD-10-CM

## 2023-04-06 RX ORDER — BUSPIRONE HYDROCHLORIDE 5 MG/1
5 TABLET ORAL NIGHTLY PRN
Qty: 90 TABLET | Refills: 0 | Status: SHIPPED | OUTPATIENT
Start: 2023-04-06

## 2023-05-19 ENCOUNTER — OFFICE VISIT (OUTPATIENT)
Dept: OBSTETRICS AND GYNECOLOGY | Facility: CLINIC | Age: 23
End: 2023-05-19
Payer: COMMERCIAL

## 2023-05-19 VITALS
WEIGHT: 146 LBS | DIASTOLIC BLOOD PRESSURE: 78 MMHG | SYSTOLIC BLOOD PRESSURE: 126 MMHG | BODY MASS INDEX: 24.92 KG/M2 | HEIGHT: 64 IN

## 2023-05-19 DIAGNOSIS — Z01.419 WOMEN'S ANNUAL ROUTINE GYNECOLOGICAL EXAMINATION: Primary | ICD-10-CM

## 2023-05-19 DIAGNOSIS — Z30.41 ENCOUNTER FOR SURVEILLANCE OF CONTRACEPTIVE PILLS: ICD-10-CM

## 2023-05-19 DIAGNOSIS — Z11.3 SCREENING FOR STD (SEXUALLY TRANSMITTED DISEASE): ICD-10-CM

## 2023-05-19 RX ORDER — LEVONORGESTREL / ETHINYL ESTRADIOL AND ETHINYL ESTRADIOL 150-30(84)
1 KIT ORAL DAILY
Qty: 91 EACH | Refills: 4 | Status: SHIPPED | OUTPATIENT
Start: 2023-05-19

## 2023-05-19 NOTE — PROGRESS NOTES
GYN Annual Exam     Chief Complaint   Patient presents with   • Annual Exam     Last pap  normal     HPI    Kary Kern is a 22 y.o. female who presents for annual well woman exam.  She is sexually active. Periods are every 3 months, lasting 5 days. Dysmenorrhea:none. Cyclic symptoms include none. No intermenstrual bleeding, spotting, or discharge. Performing SBE:occas  Denies history of migraine with aura, denies history of DVT, there is no family history of DVT. She is a nonsmoker.    This is my first time meeting Kary Kern  She is a patient of Dr. Cantu's.     OB History        0    Para   0    Term   0       0    AB   0    Living   0       SAB   0    IAB   0    Ectopic   0    Molar        Multiple   0    Live Births                    LMP- 2023  Current contraception: OCP (estrogen/progesterone)  Last Pap- 2022 NIL  History of abnormal Pap smear: no  History of STD-denies  Family history of uterine, colon or ovarian cancer: no  Family history of breast cancer: no  Gardasil Vaccine: completed    Past Medical History:   Diagnosis Date   • Migraines        Past Surgical History:   Procedure Laterality Date   • KNEE SURGERY           Current Outpatient Medications:   •  busPIRone (BUSPAR) 5 MG tablet, Take 1 tablet by mouth At Night As Needed (anxiety)., Disp: 90 tablet, Rfl: 0  •  Levonorgest-Eth Estrad -Day (Ashlyna) 0.15-0.03 &0.01 MG tablet, Take 1 tablet by mouth Daily., Disp: 91 each, Rfl: 4  •  spironolactone (ALDACTONE) 25 MG tablet, Take 3 tablets by mouth Every Night with a meal., Disp: 90 tablet, Rfl: 1    Allergies   Allergen Reactions   • Amoxicillin Rash       Social History     Tobacco Use   • Smoking status: Never   • Smokeless tobacco: Never   Vaping Use   • Vaping Use: Never used   Substance Use Topics   • Alcohol use: No   • Drug use: No       Family History   Problem Relation Age of Onset   • Migraines Mother    • Hypertension Mother    • Hypertension Father   "  • Parkinsonism Maternal Grandmother    • Diabetes Maternal Grandmother    • Hypertension Maternal Grandmother    • Coronary artery disease Maternal Grandmother    • Heart attack Maternal Grandmother    • Deep vein thrombosis Maternal Grandmother         smoking, in her 60's   • Hypertension Maternal Grandfather    • Hypertension Paternal Grandmother    • Diabetes Paternal Grandfather    • Hypertension Paternal Grandfather    • Breast cancer Other         fifties   • Ovarian cancer Neg Hx    • Uterine cancer Neg Hx    • Colon cancer Neg Hx    • Pulmonary embolism Neg Hx        Review of Systems   Constitutional: Negative for chills, fatigue and fever.   Gastrointestinal: Negative for abdominal distention, abdominal pain, nausea and vomiting.   Genitourinary: Negative for breast discharge, breast lump, breast pain, dysuria, menstrual problem, pelvic pain, pelvic pressure, vaginal bleeding, vaginal discharge and vaginal pain.   Musculoskeletal: Negative for gait problem.   Skin: Negative for rash.   Neurological: Negative for dizziness and headache.   Psychiatric/Behavioral: Negative for behavioral problems.       /78   Ht 162.6 cm (64.02\")   Wt 66.2 kg (146 lb)   LMP 05/18/2023   BMI 25.05 kg/m²     Physical Exam  Constitutional:       General: She is not in acute distress.     Appearance: Normal appearance. She is not ill-appearing, toxic-appearing or diaphoretic.   Genitourinary:      Vulva, bladder and urethral meatus normal.      No lesions in the vagina.      Right Labia: No rash, tenderness, lesions, skin changes or Bartholin's cyst.     Left Labia: No tenderness, lesions, skin changes, Bartholin's cyst or rash.     No labial fusion noted.      No inguinal adenopathy present in the right or left side.     No vaginal discharge, erythema, tenderness, bleeding or ulceration.      No vaginal prolapse present.     No vaginal atrophy present.       Right Adnexa: not tender, not full, not palpable, no mass " present and not absent.     Left Adnexa: not tender, not full, not palpable, no mass present and not absent.     No cervical motion tenderness, discharge, friability, lesion, polyp, nabothian cyst or eversion.      Uterus is not enlarged, fixed, tender, irregular or prolapsed.      No uterine mass detected.     No urethral tenderness or mass present.      Pelvic exam was performed with patient in the lithotomy position.   Breasts:     Breasts are symmetrical.      Right: Present. No swelling, bleeding, inverted nipple, mass, nipple discharge, skin change, tenderness or breast implant.      Left: Present. No swelling, bleeding, inverted nipple, mass, nipple discharge, skin change, tenderness or breast implant.   HENT:      Head: Normocephalic and atraumatic.   Eyes:      Pupils: Pupils are equal, round, and reactive to light.   Cardiovascular:      Rate and Rhythm: Normal rate.   Pulmonary:      Effort: Pulmonary effort is normal.   Abdominal:      General: There is no distension.      Palpations: Abdomen is soft. There is no mass.      Tenderness: There is no abdominal tenderness. There is no guarding.      Hernia: No hernia is present. There is no hernia in the left inguinal area or right inguinal area.   Musculoskeletal:         General: Normal range of motion.      Cervical back: Normal range of motion and neck supple. No tenderness.   Lymphadenopathy:      Cervical: No cervical adenopathy.      Upper Body:      Right upper body: No supraclavicular, axillary or pectoral adenopathy.      Left upper body: No supraclavicular, axillary or pectoral adenopathy.      Lower Body: No right inguinal adenopathy. No left inguinal adenopathy.   Neurological:      General: No focal deficit present.      Mental Status: She is alert and oriented to person, place, and time.      Cranial Nerves: No cranial nerve deficit.   Skin:     General: Skin is warm and dry.   Psychiatric:         Mood and Affect: Mood normal.          Behavior: Behavior normal.         Thought Content: Thought content normal.         Judgment: Judgment normal.   Vitals and nursing note reviewed.         Assessment   Diagnoses and all orders for this visit:    1. Women's annual routine gynecological examination (Primary)    2. Screening for STD (sexually transmitted disease)  -     Chlamydia trachomatis, Neisseria gonorrhoeae, Trichomonas vaginalis, PCR - Swab, Cervix    3. Encounter for surveillance of contraceptive pills  -     Levonorgest-Eth Estrad 91-Day (Ashlyna) 0.15-0.03 &0.01 MG tablet; Take 1 tablet by mouth Daily.  Dispense: 91 each; Refill: 4         Plan   1. Well woman exam: Pap collected No, up to date. Recommend MVI daily.    2. Contraception: ZABRINA refilled  3. STD: Enc condoms. Desires STD screen today- Yes. NuSwab  4. Smoking status: nonsmoker  5.  Encouraged annual mammogram screening starting at age 40. Instructed on how to perform SBE. Encouraged breast health self awareness.  6.    Encouraged 150 minutes of exercise per week if not medially contraindicated.   7.    BMI 25.05    Follow Up one year or PRN    Darlyn Dailey, APRN  5/19/2023  10:34 EDT

## 2023-06-12 ENCOUNTER — OFFICE VISIT (OUTPATIENT)
Dept: FAMILY MEDICINE CLINIC | Facility: CLINIC | Age: 23
End: 2023-06-12
Payer: COMMERCIAL

## 2023-06-12 VITALS
OXYGEN SATURATION: 100 % | WEIGHT: 148 LBS | BODY MASS INDEX: 25.27 KG/M2 | HEIGHT: 64 IN | HEART RATE: 68 BPM | SYSTOLIC BLOOD PRESSURE: 112 MMHG | DIASTOLIC BLOOD PRESSURE: 72 MMHG

## 2023-06-12 DIAGNOSIS — F41.1 GAD (GENERALIZED ANXIETY DISORDER): ICD-10-CM

## 2023-06-12 DIAGNOSIS — Z00.00 WELLNESS EXAMINATION: Primary | ICD-10-CM

## 2023-06-12 PROCEDURE — 99395 PREV VISIT EST AGE 18-39: CPT | Performed by: FAMILY MEDICINE

## 2023-06-12 RX ORDER — BUSPIRONE HYDROCHLORIDE 5 MG/1
5 TABLET ORAL NIGHTLY PRN
Qty: 90 TABLET | Refills: 3 | Status: SHIPPED | OUTPATIENT
Start: 2023-06-12

## 2023-06-12 NOTE — PATIENT INSTRUCTIONS
Calorie Counting for Weight Loss  Calories are units of energy. Your body needs a certain number of calories from food to keep going throughout the day. When you eat or drink more calories than your body needs, your body stores the extra calories mostly as fat. When you eat or drink fewer calories than your body needs, your body burns fat to get the energy it needs.  Calorie counting means keeping track of how many calories you eat and drink each day. Calorie counting can be helpful if you need to lose weight. If you eat fewer calories than your body needs, you should lose weight. Ask your health care provider what a healthy weight is for you.  For calorie counting to work, you will need to eat the right number of calories each day to lose a healthy amount of weight per week. A dietitian can help you figure out how many calories you need in a day and will suggest ways to reach your calorie goal.  A healthy amount of weight to lose each week is usually 1-2 lb (0.5-0.9 kg). This usually means that your daily calorie intake should be reduced by 500-750 calories.  Eating 1,200-1,500 calories a day can help most women lose weight.  Eating 1,500-1,800 calories a day can help most men lose weight.  What do I need to know about calorie counting?  Work with your health care provider or dietitian to determine how many calories you should get each day. To meet your daily calorie goal, you will need to:  Find out how many calories are in each food that you would like to eat. Try to do this before you eat.  Decide how much of the food you plan to eat.  Keep a food log. Do this by writing down what you ate and how many calories it had.  To successfully lose weight, it is important to balance calorie counting with a healthy lifestyle that includes regular activity.  Where do I find calorie information?  The number of calories in a food can be found on a Nutrition Facts label. If a food does not have a Nutrition Facts label, try to  look up the calories online or ask your dietitian for help.  Remember that calories are listed per serving. If you choose to have more than one serving of a food, you will have to multiply the calories per serving by the number of servings you plan to eat. For example, the label on a package of bread might say that a serving size is 1 slice and that there are 90 calories in a serving. If you eat 1 slice, you will have eaten 90 calories. If you eat 2 slices, you will have eaten 180 calories.  How do I keep a food log?  After each time that you eat, record the following in your food log as soon as possible:  What you ate. Be sure to include toppings, sauces, and other extras on the food.  How much you ate. This can be measured in cups, ounces, or number of items.  How many calories were in each food and drink.  The total number of calories in the food you ate.  Keep your food log near you, such as in a pocket-sized notebook or on an chin or website on your mobile phone. Some programs will calculate calories for you and show you how many calories you have left to meet your daily goal.  What are some portion-control tips?  Know how many calories are in a serving. This will help you know how many servings you can have of a certain food.  Use a measuring cup to measure serving sizes. You could also try weighing out portions on a kitchen scale. With time, you will be able to estimate serving sizes for some foods.  Take time to put servings of different foods on your favorite plates or in your favorite bowls and cups so you know what a serving looks like.  Try not to eat straight from a food's packaging, such as from a bag or box. Eating straight from the package makes it hard to see how much you are eating and can lead to overeating. Put the amount you would like to eat in a cup or on a plate to make sure you are eating the right portion.  Use smaller plates, glasses, and bowls for smaller portions and to prevent  overeating.  Try not to multitask. For example, avoid watching TV or using your computer while eating. If it is time to eat, sit down at a table and enjoy your food. This will help you recognize when you are full. It will also help you be more mindful of what and how much you are eating.  What are tips for following this plan?  Reading food labels  Check the calorie count compared with the serving size. The serving size may be smaller than what you are used to eating.  Check the source of the calories. Try to choose foods that are high in protein, fiber, and vitamins, and low in saturated fat, trans fat, and sodium.  Shopping  Read nutrition labels while you shop. This will help you make healthy decisions about which foods to buy.  Pay attention to nutrition labels for low-fat or fat-free foods. These foods sometimes have the same number of calories or more calories than the full-fat versions. They also often have added sugar, starch, or salt to make up for flavor that was removed with the fat.  Make a grocery list of lower-calorie foods and stick to it.  Cooking  Try to cook your favorite foods in a healthier way. For example, try baking instead of frying.  Use low-fat dairy products.  Meal planning  Use more fruits and vegetables. One-half of your plate should be fruits and vegetables.  Include lean proteins, such as chicken, turkey, and fish.  Lifestyle  Each week, aim to do one of the followin minutes of moderate exercise, such as walking.  75 minutes of vigorous exercise, such as running.  General information  Know how many calories are in the foods you eat most often. This will help you calculate calorie counts faster.  Find a way of tracking calories that works for you. Get creative. Try different apps or programs if writing down calories does not work for you.  What foods should I eat?    Eat nutritious foods. It is better to have a nutritious, high-calorie food, such as an avocado, than a food with  few nutrients, such as a bag of potato chips.  Use your calories on foods and drinks that will fill you up and will not leave you hungry soon after eating.  Examples of foods that fill you up are nuts and nut butters, vegetables, lean proteins, and high-fiber foods such as whole grains. High-fiber foods are foods with more than 5 g of fiber per serving.  Pay attention to calories in drinks. Low-calorie drinks include water and unsweetened drinks.  The items listed above may not be a complete list of foods and beverages you can eat. Contact a dietitian for more information.  What foods should I limit?  Limit foods or drinks that are not good sources of vitamins, minerals, or protein or that are high in unhealthy fats. These include:  Candy.  Other sweets.  Sodas, specialty coffee drinks, alcohol, and juice.  The items listed above may not be a complete list of foods and beverages you should avoid. Contact a dietitian for more information.  How do I count calories when eating out?  Pay attention to portions. Often, portions are much larger when eating out. Try these tips to keep portions smaller:  Consider sharing a meal instead of getting your own.  If you get your own meal, eat only half of it. Before you start eating, ask for a container and put half of your meal into it.  When available, consider ordering smaller portions from the menu instead of full portions.  Pay attention to your food and drink choices. Knowing the way food is cooked and what is included with the meal can help you eat fewer calories.  If calories are listed on the menu, choose the lower-calorie options.  Choose dishes that include vegetables, fruits, whole grains, low-fat dairy products, and lean proteins.  Choose items that are boiled, broiled, grilled, or steamed. Avoid items that are buttered, battered, fried, or served with cream sauce. Items labeled as crispy are usually fried, unless stated otherwise.  Choose water, low-fat milk,  unsweetened iced tea, or other drinks without added sugar. If you want an alcoholic beverage, choose a lower-calorie option, such as a glass of wine or light beer.  Ask for dressings, sauces, and syrups on the side. These are usually high in calories, so you should limit the amount you eat.  If you want a salad, choose a garden salad and ask for grilled meats. Avoid extra toppings such as wilder, cheese, or fried items. Ask for the dressing on the side, or ask for olive oil and vinegar or lemon to use as dressing.  Estimate how many servings of a food you are given. Knowing serving sizes will help you be aware of how much food you are eating at restaurants.  Where to find more information  Centers for Disease Control and Prevention: www.cdc.gov  [place] Department of Agriculture: myplate.gov  Summary  Calorie counting means keeping track of how many calories you eat and drink each day. If you eat fewer calories than your body needs, you should lose weight.  A healthy amount of weight to lose per week is usually 1-2 lb (0.5-0.9 kg). This usually means reducing your daily calorie intake by 500-750 calories.  The number of calories in a food can be found on a Nutrition Facts label. If a food does not have a Nutrition Facts label, try to look up the calories online or ask your dietitian for help.  Use smaller plates, glasses, and bowls for smaller portions and to prevent overeating.  Use your calories on foods and drinks that will fill you up and not leave you hungry shortly after a meal.  This information is not intended to replace advice given to you by your health care provider. Make sure you discuss any questions you have with your health care provider.  Document Revised: 01/28/2021 Document Reviewed: 01/28/2021  Elsevier Patient Education © 2022 [employer]    Exercising to Lose Weight  Getting regular exercise is important for everyone. It is especially important if you are overweight. Being overweight increases  your risk of heart disease, stroke, diabetes, high blood pressure, and several types of cancer. Exercising, and reducing the calories you consume, can help you lose weight and improve fitness and health.  Exercise can be moderate or vigorous intensity. To lose weight, most people need to do a certain amount of moderate or vigorous-intensity exercise each week.  How can exercise affect me?  You lose weight when you exercise enough to burn more calories than you eat. Exercise also reduces body fat and builds muscle. The more muscle you have, the more calories you burn. Exercise also:  Improves mood.  Reduces stress and tension.  Improves your overall fitness, flexibility, and endurance.  Increases bone strength.  Moderate-intensity exercise  Moderate-intensity exercise is any activity that gets you moving enough to burn at least three times more energy (calories) than if you were sitting.  Examples of moderate exercise include:  Walking a mile in 15 minutes.  Doing light yard work.  Biking at an easy pace.  Most people should get at least 150 minutes of moderate-intensity exercise a week to maintain their body weight.  Vigorous-intensity exercise  Vigorous-intensity exercise is any activity that gets you moving enough to burn at least six times more calories than if you were sitting. When you exercise at this intensity, you should be working hard enough that you are not able to carry on a conversation.  Examples of vigorous exercise include:  Running.  Playing a team sport, such as football, basketball, and soccer.  Jumping rope.  Most people should get at least 75 minutes a week of vigorous exercise to maintain their body weight.  What actions can I take to lose weight?  The amount of exercise you need to lose weight depends on:  Your age.  The type of exercise.  Any health conditions you have.  Your overall physical ability.  Talk to your health care provider about how much exercise you need and what types of  activities are safe for you.  Nutrition    Make changes to your diet as told by your health care provider or diet and nutrition specialist (dietitian). This may include:  Eating fewer calories.  Eating more protein.  Eating less unhealthy fats.  Eating a diet that includes fresh fruits and vegetables, whole grains, low-fat dairy products, and lean protein.  Avoiding foods with added fat, salt, and sugar.  Drink plenty of water while you exercise to prevent dehydration or heat stroke.  Activity  Choose an activity that you enjoy and set realistic goals. Your health care provider can help you make an exercise plan that works for you.  Exercise at a moderate or vigorous intensity most days of the week.  The intensity of exercise may vary from person to person. You can tell how intense a workout is for you by paying attention to your breathing and heartbeat. Most people will notice their breathing and heartbeat get faster with more intense exercise.  Do resistance training twice each week, such as:  Push-ups.  Sit-ups.  Lifting weights.  Using resistance bands.  Getting short amounts of exercise can be just as helpful as long, structured periods of exercise. If you have trouble finding time to exercise, try doing these things as part of your daily routine:  Get up, stretch, and walk around every 30 minutes throughout the day.  Go for a walk during your lunch break.  Park your car farther away from your destination.  If you take public transportation, get off one stop early and walk the rest of the way.  Make phone calls while standing up and walking around.  Take the stairs instead of elevators or escalators.  Wear comfortable clothes and shoes with good support.  Do not exercise so much that you hurt yourself, feel dizzy, or get very short of breath.  Where to find more information  [place] Department of Health and Human Services: www.hhs.gov  Centers for Disease Control and Prevention: www.cdc.gov  Contact a health care  provider:  Before starting a new exercise program.  If you have questions or concerns about your weight.  If you have a medical problem that keeps you from exercising.  Get help right away if:  You have any of the following while exercising:  Injury.  Dizziness.  Difficulty breathing or shortness of breath that does not go away when you stop exercising.  Chest pain.  Rapid heartbeat.  These symptoms may represent a serious problem that is an emergency. Do not wait to see if the symptoms will go away. Get medical help right away. Call your local emergency services (911 in the [place] Do not drive yourself to the hospital.  Summary  Getting regular exercise is especially important if you are overweight.  Being overweight increases your risk of heart disease, stroke, diabetes, high blood pressure, and several types of cancer.  Losing weight happens when you burn more calories than you eat.  Reducing the amount of calories you eat, and getting regular moderate or vigorous exercise each week, helps you lose weight.  This information is not intended to replace advice given to you by your health care provider. Make sure you discuss any questions you have with your health care provider.  Document Revised: 02/13/2022 Document Reviewed: 02/13/2022  Elsevier Patient Education © 2022 [employer]

## 2023-06-12 NOTE — PROGRESS NOTES
"Subjective   Kary Kern is a 22 y.o. female. Presents today for   Chief Complaint   Patient presents with    Annual Exam    Anxiety       History of Present Illness  Patient 21 y/o here for wellness exam;  anxiety ok;  trying exercise regularly;  no cp/soa;  no abd pain;    Review of Systems   Respiratory:  Negative for shortness of breath.    Cardiovascular:  Negative for chest pain and palpitations.     Patient Active Problem List   Diagnosis    Encounter for gynecological examination without abnormal finding    Patellar tendinitis of both knees       Social History     Socioeconomic History    Marital status: Single   Tobacco Use    Smoking status: Never    Smokeless tobacco: Never   Vaping Use    Vaping Use: Never used   Substance and Sexual Activity    Alcohol use: No    Drug use: No    Sexual activity: Yes     Partners: Male     Birth control/protection: None, OCP       Allergies   Allergen Reactions    Amoxicillin Rash       Current Outpatient Medications on File Prior to Visit   Medication Sig Dispense Refill    busPIRone (BUSPAR) 5 MG tablet Take 1 tablet by mouth At Night As Needed (anxiety). 90 tablet 0    Levonorgest-Eth Estrad 91-Day (Ashlyna) 0.15-0.03 &0.01 MG tablet Take 1 tablet by mouth Daily. 91 each 4    spironolactone (ALDACTONE) 25 MG tablet Take 3 tablets by mouth Every Night with a meal. 90 tablet 1     No current facility-administered medications on file prior to visit.       Objective   Vitals:    06/12/23 1448   BP: 112/72   Pulse: 68   SpO2: 100%   Weight: 67.1 kg (148 lb)   Height: 162.6 cm (64\")     Body mass index is 25.4 kg/m².    Physical Exam  Vitals and nursing note reviewed.   Constitutional:       Appearance: She is well-developed.   HENT:      Head: Normocephalic and atraumatic.   Neck:      Thyroid: No thyromegaly.      Vascular: No JVD.   Cardiovascular:      Rate and Rhythm: Normal rate and regular rhythm.      Heart sounds: Normal heart sounds. No murmur heard.    No " friction rub. No gallop.   Pulmonary:      Effort: Pulmonary effort is normal. No respiratory distress.      Breath sounds: Normal breath sounds. No wheezing or rales.   Abdominal:      General: Bowel sounds are normal. There is no distension.      Palpations: Abdomen is soft.      Tenderness: There is no abdominal tenderness. There is no guarding or rebound.   Musculoskeletal:      Cervical back: Neck supple.   Skin:     General: Skin is warm and dry.   Neurological:      Mental Status: She is alert.   Psychiatric:         Behavior: Behavior normal.       Assessment & Plan   Diagnoses and all orders for this visit:    1. Wellness examination (Primary)    2. CRISTEL (generalized anxiety disorder)  -     busPIRone (BUSPAR) 5 MG tablet; Take 1 tablet by mouth At Night As Needed (anxiety).  Dispense: 90 tablet; Refill: 3    Counseled on diet and exercise           BMI is >= 25 and <30. (Overweight) The following options were offered after discussion;: weight loss educational material (shared in after visit summary)     -Follow up: 12 months and prn

## 2023-12-28 ENCOUNTER — OFFICE VISIT (OUTPATIENT)
Dept: FAMILY MEDICINE CLINIC | Facility: CLINIC | Age: 23
End: 2023-12-28
Payer: COMMERCIAL

## 2023-12-28 VITALS
HEART RATE: 70 BPM | HEIGHT: 64 IN | OXYGEN SATURATION: 99 % | BODY MASS INDEX: 26.09 KG/M2 | SYSTOLIC BLOOD PRESSURE: 116 MMHG | WEIGHT: 152.8 LBS | DIASTOLIC BLOOD PRESSURE: 72 MMHG

## 2023-12-28 DIAGNOSIS — F41.9 ANXIETY: Primary | ICD-10-CM

## 2023-12-28 PROCEDURE — 99213 OFFICE O/P EST LOW 20 MIN: CPT | Performed by: NURSE PRACTITIONER

## 2023-12-28 RX ORDER — MIRTAZAPINE 15 MG/1
15 TABLET, FILM COATED ORAL NIGHTLY
Qty: 30 TABLET | Refills: 1 | Status: SHIPPED | OUTPATIENT
Start: 2023-12-28

## 2023-12-28 NOTE — PROGRESS NOTES
"Subjective   Kary Kern is a 23 y.o. female. 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  The physician she rotated with made her feel stupid, worsening the whole experience.  She presents today for school assistance paperwork to be filled out due to her learning disability.      Patient Active Problem List   Diagnosis    Encounter for gynecological examination without abnormal finding    Patellar tendinitis of both knees       Social History     Socioeconomic History    Marital status: Single   Tobacco Use    Smoking status: Never    Smokeless tobacco: Never   Vaping Use    Vaping Use: Never used   Substance and Sexual Activity    Alcohol use: Yes     Comment: Rare Occ    Drug use: No    Sexual activity: Yes     Partners: Male     Birth control/protection: None, OCP       Allergies   Allergen Reactions    Amoxicillin Rash       Current Outpatient Medications on File Prior to Visit   Medication Sig Dispense Refill    busPIRone (BUSPAR) 5 MG tablet Take 1 tablet by mouth At Night As Needed (anxiety). 90 tablet 3    Levonorgest-Eth Estrad 91-Day (Ashlyna) 0.15-0.03 &0.01 MG Take 1 tablet by mouth Daily. 91 each 4    spironolactone (ALDACTONE) 25 MG tablet Take 3 tablets by mouth Every Evening with a meal 90 tablet 5    spironolactone (ALDACTONE) 25 MG tablet Take 3 tablets by mouth Every Night with a meal. 90 tablet 1     No current facility-administered medications on file prior to visit.       Objective   Vitals:    12/28/23 1106   BP: 116/72   Pulse: 70   SpO2: 99%   Weight: 69.3 kg (152 lb 12.8 oz)   Height: 162.6 cm (64\")     Body mass index is 26.23 kg/m².    Physical Exam  Constitutional:       Appearance: She is normal " weight.   HENT:      Head: Normocephalic and atraumatic.      Right Ear: Tympanic membrane, ear canal and external ear normal.      Left Ear: Tympanic membrane, ear canal and external ear normal.      Mouth/Throat:      Mouth: Mucous membranes are moist.   Eyes:      Pupils: Pupils are equal, round, and reactive to light.   Cardiovascular:      Rate and Rhythm: Normal rate and regular rhythm.      Pulses: Normal pulses.      Heart sounds: Normal heart sounds.   Pulmonary:      Effort: Pulmonary effort is normal.      Breath sounds: Normal breath sounds.   Abdominal:      General: Bowel sounds are normal.   Musculoskeletal:         General: Normal range of motion.   Skin:     General: Skin is warm and dry.      Capillary Refill: Capillary refill takes less than 2 seconds.   Neurological:      General: No focal deficit present.      Mental Status: She is alert.   Psychiatric:         Mood and Affect: Mood normal.     Procedures     Assessment & Plan   Diagnoses and all orders for this visit:    1. Anxiety (Primary)  -     mirtazapine (Remeron) 15 MG tablet; Take 1 tablet by mouth Every Night.  Dispense: 30 tablet; Refill: 1             Return in about 6 months (around 6/28/2024).

## 2024-01-03 ENCOUNTER — PATIENT ROUNDING (BHMG ONLY) (OUTPATIENT)
Dept: FAMILY MEDICINE CLINIC | Facility: CLINIC | Age: 24
End: 2024-01-03
Payer: COMMERCIAL

## 2024-01-03 NOTE — PROGRESS NOTES
A [employer] message has been sent to the patient for [place] [facility]

## 2024-02-13 DIAGNOSIS — F41.9 ANXIETY: Primary | ICD-10-CM

## 2024-02-13 RX ORDER — ESCITALOPRAM OXALATE 10 MG/1
10 TABLET ORAL DAILY
Qty: 30 TABLET | Refills: 1 | Status: SHIPPED | OUTPATIENT
Start: 2024-02-13

## 2024-04-18 DIAGNOSIS — F41.9 ANXIETY: ICD-10-CM

## 2024-04-19 RX ORDER — ESCITALOPRAM OXALATE 10 MG/1
10 TABLET ORAL DAILY
Qty: 30 TABLET | Refills: 1 | Status: SHIPPED | OUTPATIENT
Start: 2024-04-19

## 2024-05-19 DIAGNOSIS — Z30.41 ENCOUNTER FOR SURVEILLANCE OF CONTRACEPTIVE PILLS: ICD-10-CM

## 2024-05-19 RX ORDER — LEVONORGESTREL / ETHINYL ESTRADIOL AND ETHINYL ESTRADIOL 150-30(84)
1 KIT ORAL DAILY
Qty: 91 EACH | Refills: 4 | Status: CANCELLED | OUTPATIENT
Start: 2024-05-19

## 2024-05-20 RX ORDER — LEVONORGESTREL / ETHINYL ESTRADIOL AND ETHINYL ESTRADIOL 150-30(84)
1 KIT ORAL DAILY
Qty: 91 EACH | Refills: 0 | Status: SHIPPED | OUTPATIENT
Start: 2024-05-20 | End: 2024-05-21 | Stop reason: SDUPTHER

## 2024-05-20 NOTE — TELEPHONE ENCOUNTER
Med refill. [facility] pt. AE scheduled 5/21/24. Last AE 5/19/23. [facility]. Thank you.

## 2024-05-21 ENCOUNTER — OFFICE VISIT (OUTPATIENT)
Dept: OBSTETRICS AND GYNECOLOGY | Facility: CLINIC | Age: 24
End: 2024-05-21
Payer: COMMERCIAL

## 2024-05-21 VITALS
HEIGHT: 64 IN | BODY MASS INDEX: 26.8 KG/M2 | WEIGHT: 157 LBS | HEART RATE: 71 BPM | SYSTOLIC BLOOD PRESSURE: 103 MMHG | DIASTOLIC BLOOD PRESSURE: 71 MMHG

## 2024-05-21 DIAGNOSIS — Z30.41 ENCOUNTER FOR SURVEILLANCE OF CONTRACEPTIVE PILLS: ICD-10-CM

## 2024-05-21 DIAGNOSIS — Z01.419 WELL WOMAN EXAM WITH ROUTINE GYNECOLOGICAL EXAM: Primary | ICD-10-CM

## 2024-05-21 RX ORDER — LEVONORGESTREL / ETHINYL ESTRADIOL AND ETHINYL ESTRADIOL 150-30(84)
1 KIT ORAL DAILY
Qty: 91 EACH | Refills: 4 | Status: SHIPPED | OUTPATIENT
Start: 2024-05-21

## 2024-05-21 NOTE — PROGRESS NOTES
Subjective   Kary Kern is a 23 y.o. female   Chief Complaint   Patient presents with    Well woman exam with routine gynecological exam      History of Present Illness  Kary Kern is a 23 y.o.  who presents for an annual examination    Pap history:  Last pap:  NIL  Prior abnormal paps: no  STDs  Sexually active: no  History of STDs: no  Has had HPV vaccine: completed  Contraception:  OCP - for menstrual migraines; on 91 day pill; desires to continue  Considering conception in       Screening for BRCA-   No known risk factors for BRCA    Past Medical History:   Diagnosis Date    Anxiety     Migraines      Past Surgical History:   Procedure Laterality Date    KNEE SURGERY       Social History     Tobacco Use    Smoking status: Never    Smokeless tobacco: Never   Vaping Use    Vaping status: Never Used   Substance Use Topics    Alcohol use: Yes     Comment: Rare Occ    Drug use: No     Family History   Problem Relation Age of Onset    Migraines Mother     Hypertension Mother     Alcohol abuse Father     Hypertension Father     Parkinsonism Maternal Grandmother     Diabetes Maternal Grandmother     Hypertension Maternal Grandmother     Coronary artery disease Maternal Grandmother     Heart attack Maternal Grandmother     Deep vein thrombosis Maternal Grandmother         smoking, in her 60's    Hypertension Maternal Grandfather     Hypertension Paternal Grandmother     Diabetes Paternal Grandfather     Hypertension Paternal Grandfather     Breast cancer Other         fifties    Ovarian cancer Neg Hx     Uterine cancer Neg Hx     Colon cancer Neg Hx     Pulmonary embolism Neg Hx      Current Outpatient Medications on File Prior to Visit   Medication Sig Dispense Refill    escitalopram (Lexapro) 10 MG tablet Take 1 tablet by mouth Daily. 30 tablet 1    spironolactone (ALDACTONE) 25 MG tablet Take 3 tablets by mouth Every Evening with a meal 90 tablet 5    spironolactone (ALDACTONE) 25 MG tablet Take  "3 tablets by mouth every night at bedtime. 90 tablet 2    [DISCONTINUED] Levonorgest-Eth Estrad 91-Day (Simpesse) 0.15-0.03 &0.01 MG Take 1 tablet by mouth Daily. 91 each 0    [DISCONTINUED] mirtazapine (Remeron) 15 MG tablet Take 1 tablet by mouth Every Night. 30 tablet 1    [DISCONTINUED] busPIRone (BUSPAR) 5 MG tablet Take 1 tablet by mouth At Night As Needed (anxiety). (Patient not taking: Reported on 5/21/2024) 90 tablet 3     No current facility-administered medications on file prior to visit.     Allergies   Allergen Reactions    Amoxicillin Rash    Review of Systems   Constitutional:  Negative for chills, fever and unexpected weight change.   HENT:  Negative for congestion, nosebleeds and sore throat.    Eyes:  Negative for visual disturbance.   Respiratory:  Negative for cough, chest tightness and shortness of breath.    Cardiovascular:  Negative for chest pain and palpitations.   Gastrointestinal:  Negative for abdominal pain, constipation, diarrhea, nausea and vomiting.   Endocrine: Negative for polyuria.   Genitourinary:  Negative for difficulty urinating, dyspareunia, dysuria, frequency, genital sores, hematuria, menstrual problem, pelvic pain, urgency, vaginal bleeding, vaginal discharge and vaginal pain.   Musculoskeletal:  Negative for arthralgias and joint swelling.   Skin:  Negative for color change and rash.   Neurological:  Negative for dizziness, light-headedness and headaches.   Hematological:  Does not bruise/bleed easily.   Psychiatric/Behavioral:  Negative for dysphoric mood. The patient is not nervous/anxious.        Objective    /71   Pulse 71   Ht 162.6 cm (64.02\")   Wt 71.2 kg (157 lb)   BMI 26.94 kg/m²    Physical Exam   Constitutional: She is oriented to person, place, and time. She appears well-developed. No distress.   HENT:   Head: Normocephalic and atraumatic.   Neck: No tracheal deviation present. No thyromegaly present.   Cardiovascular: Normal rate, regular rhythm and " normal heart sounds. Exam reveals no gallop and no friction rub.   No murmur heard.  Pulmonary/Chest: Effort normal and breath sounds normal. No respiratory distress. She has no wheezes. She has no rales. She exhibits no tenderness. Right breast exhibits no inverted nipple, no mass, no nipple discharge, no skin change and no tenderness. Left breast exhibits no inverted nipple, no mass, no nipple discharge, no skin change and no tenderness.   Abdominal: Soft. She exhibits no distension and no mass. There is no abdominal tenderness.   Genitourinary: There is no rash, lesion or injury on the right labia. There is no rash, lesion or injury on the left labia. Left adnexum displays no mass.    Genitourinary Comments: Internal exam deferred     Musculoskeletal: Normal range of motion. No deformity.   Lymphadenopathy:     She has no cervical adenopathy.   Neurological: She is alert and oriented to person, place, and time.   Skin: Skin is warm and dry. No rash noted. She is not diaphoretic.   Psychiatric: Her behavior is normal. Judgment and thought content normal.         Assessment & Plan   Diagnoses and all orders for this visit:    1. Well woman exam with routine gynecological exam (Primary)    2. Encounter for surveillance of contraceptive pills  -     Levonorgest-Eth Estrad 91-Day (Simpesse) 0.15-0.03 &0.01 MG; Take 1 tablet by mouth Daily.  Dispense: 91 each; Refill: 4    Well woman counseling/screening:    Cervical cancer screening:    Reports no h/o cervical dysplasia   HPV vaccination recommended    The patient is due for a pap in 2025     Screening guidelines discussed with patient  Breast cancer screening:    Clinical breast exam recommended for age 20-39 years every 1-3 years   Mammogram recommended starting age 40, Discussed risks and benefits of earlier versus later screening   Breast self awareness encouraged  STD Screening   Gonorrhea/chlamydia/trichomonas swab desired  Contraception :   Desires to continue  oral contraceptive pill    Reviewed folic acid supplementation, return to fertility  Family history    does not demonstrate need for genetics referral   Healthy lifestyle counseling:   Patient was counseled on    Body mass index is 26.94 kg/m².

## 2024-06-14 DIAGNOSIS — F41.9 ANXIETY: ICD-10-CM

## 2024-06-14 RX ORDER — ESCITALOPRAM OXALATE 10 MG/1
10 TABLET ORAL DAILY
Qty: 30 TABLET | Refills: 1 | Status: SHIPPED | OUTPATIENT
Start: 2024-06-14

## 2024-06-28 ENCOUNTER — OFFICE VISIT (OUTPATIENT)
Dept: FAMILY MEDICINE CLINIC | Facility: CLINIC | Age: 24
End: 2024-06-28
Payer: COMMERCIAL

## 2024-06-28 VITALS
OXYGEN SATURATION: 98 % | DIASTOLIC BLOOD PRESSURE: 72 MMHG | HEART RATE: 72 BPM | SYSTOLIC BLOOD PRESSURE: 108 MMHG | WEIGHT: 159.2 LBS | HEIGHT: 64 IN | BODY MASS INDEX: 27.18 KG/M2

## 2024-06-28 DIAGNOSIS — E78.2 MIXED HYPERLIPIDEMIA: ICD-10-CM

## 2024-06-28 DIAGNOSIS — Z13.29 THYROID DISORDER SCREENING: ICD-10-CM

## 2024-06-28 DIAGNOSIS — Z83.3 FAMILY HISTORY OF DIABETES MELLITUS: Primary | ICD-10-CM

## 2024-06-28 PROCEDURE — 99395 PREV VISIT EST AGE 18-39: CPT | Performed by: NURSE PRACTITIONER

## 2024-06-28 NOTE — PROGRESS NOTES
"Subjective   Kary Kern is a 23 y.o. female. Presents today for 6 week follow up.  Chief Complaint   Patient presents with    Annual Exam       History Of Present Illness:  She is here for 6 month follow up.  She just graduated and is in the process of job selection.  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  She is due her annual physical as well.      Patient Active Problem List   Diagnosis    Encounter for gynecological examination without abnormal finding    Patellar tendinitis of both knees       Social History     Socioeconomic History    Marital status: Single   Tobacco Use    Smoking status: Never    Smokeless tobacco: Never   Vaping Use    Vaping status: Never Used   Substance and Sexual Activity    Alcohol use: Yes     Comment: Rare Occ    Drug use: No    Sexual activity: Yes     Partners: Male     Birth control/protection: None, OCP       Allergies   Allergen Reactions    Amoxicillin Rash       Current Outpatient Medications on File Prior to Visit   Medication Sig Dispense Refill    escitalopram (Lexapro) 10 MG tablet Take 1 tablet by mouth Daily. 30 tablet 1    Levonorgest-Eth Estrad 91-Day (Simpesse) 0.15-0.03 &0.01 MG Take 1 tablet by mouth Daily. 91 each 4    spironolactone (ALDACTONE) 25 MG tablet Take 3 tablets by mouth Every Evening with a meal 90 tablet 5    [DISCONTINUED] spironolactone (ALDACTONE) 25 MG tablet Take 3 tablets by mouth every night at bedtime. 90 tablet 2    [DISCONTINUED] spironolactone (ALDACTONE) 25 MG tablet Take 3 tablets by mouth Daily. 90 tablet 11     No current facility-administered medications on file prior to visit.       Objective   Vitals:    06/28/24 0812   BP: 108/72   Pulse: 72   SpO2: 98%   Weight: 72.2 kg (159 lb 3.2 oz)   Height: 162.6 cm (64.02\")     Body mass index is 27.31 kg/m².    Physical Exam  Constitutional:       Appearance: She is obese.   HENT:      Head: Normocephalic and atraumatic.      Nose: Nose normal.      Mouth/Throat:     "  Mouth: Mucous membranes are moist.   Eyes:      Pupils: Pupils are equal, round, and reactive to light.   Cardiovascular:      Rate and Rhythm: Normal rate and regular rhythm.   Pulmonary:      Effort: Pulmonary effort is normal.      Breath sounds: Normal breath sounds.   Abdominal:      General: Bowel sounds are normal.   Musculoskeletal:         General: Normal range of motion.   Skin:     General: Skin is warm and dry.      Capillary Refill: Capillary refill takes less than 2 seconds.   Neurological:      General: No focal deficit present.      Mental Status: She is alert.   Psychiatric:         Mood and Affect: Mood normal.       Procedures     Assessment & Plan   Diagnoses and all orders for this visit:    1. Family history of diabetes mellitus (Primary)  -     Hemoglobin A1c  -     Basic Metabolic Panel  -     CBC & Differential    2. Mixed hyperlipidemia  -     Lipid Panel    3. Thyroid disorder screening  -     Thyroid Panel With TSH        BMI is >= 25 and <30. (Overweight) The following options were offered after discussion;: weight loss educational material (shared in after visit summary), exercise counseling/recommendations, nutrition counseling/recommendations, and pharmacological intervention options    Discussed Care Gaps, ordered referrals and encouraged vaccination updates.       - Pt agrees with plan of care and denies further questions/concerns today  - This document is intended for medical expert use only. Persons  reading this document without medical staff guidance may result in misinterpretation and unintended morbidity     Go to the ER for any possible life-threatening symptoms such as chest pain or shortness of air.      Please allow 3-5 business days for recommendations based on new results      I personally spent time with this patient, preparing for the visit, reviewing tests, obtaining and/or reviewing a separately obtained history, performing a medically appropriate examination and/or  evaluation, counseling and educating the patient/family/caregiver, ordering medications,  documenting information in the medical record and indepentently interpreting results.          Return in about 6 months (around 12/28/2024).

## 2024-06-29 LAB
BASOPHILS # BLD AUTO: 0.04 10*3/MM3 (ref 0–0.2)
BASOPHILS NFR BLD AUTO: 0.5 % (ref 0–1.5)
BUN SERPL-MCNC: 10 MG/DL (ref 6–20)
BUN/CREAT SERPL: 11.2 (ref 7–25)
CALCIUM SERPL-MCNC: 9.3 MG/DL (ref 8.6–10.5)
CHLORIDE SERPL-SCNC: 106 MMOL/L (ref 98–107)
CHOLEST SERPL-MCNC: 152 MG/DL (ref 0–200)
CO2 SERPL-SCNC: 23.3 MMOL/L (ref 22–29)
CREAT SERPL-MCNC: 0.89 MG/DL (ref 0.57–1)
EGFRCR SERPLBLD CKD-EPI 2021: 93.6 ML/MIN/1.73
EOSINOPHIL # BLD AUTO: 0.09 10*3/MM3 (ref 0–0.4)
EOSINOPHIL NFR BLD AUTO: 1.2 % (ref 0.3–6.2)
ERYTHROCYTE [DISTWIDTH] IN BLOOD BY AUTOMATED COUNT: 11.6 % (ref 12.3–15.4)
FT4I SERPL CALC-MCNC: 2.1 (ref 1.2–4.9)
GLUCOSE SERPL-MCNC: 76 MG/DL (ref 65–99)
HBA1C MFR BLD: 5.1 % (ref 4.8–5.6)
HCT VFR BLD AUTO: 41.9 % (ref 34–46.6)
HDLC SERPL-MCNC: 47 MG/DL (ref 40–60)
HGB BLD-MCNC: 13.8 G/DL (ref 12–15.9)
IMM GRANULOCYTES # BLD AUTO: 0.02 10*3/MM3 (ref 0–0.05)
IMM GRANULOCYTES NFR BLD AUTO: 0.3 % (ref 0–0.5)
LDLC SERPL CALC-MCNC: 87 MG/DL (ref 0–100)
LYMPHOCYTES # BLD AUTO: 3.02 10*3/MM3 (ref 0.7–3.1)
LYMPHOCYTES NFR BLD AUTO: 40.4 % (ref 19.6–45.3)
MCH RBC QN AUTO: 29.6 PG (ref 26.6–33)
MCHC RBC AUTO-ENTMCNC: 32.9 G/DL (ref 31.5–35.7)
MCV RBC AUTO: 89.7 FL (ref 79–97)
MONOCYTES # BLD AUTO: 0.53 10*3/MM3 (ref 0.1–0.9)
MONOCYTES NFR BLD AUTO: 7.1 % (ref 5–12)
NEUTROPHILS # BLD AUTO: 3.78 10*3/MM3 (ref 1.7–7)
NEUTROPHILS NFR BLD AUTO: 50.5 % (ref 42.7–76)
NRBC BLD AUTO-RTO: 0 /100 WBC (ref 0–0.2)
PLATELET # BLD AUTO: 334 10*3/MM3 (ref 140–450)
POTASSIUM SERPL-SCNC: 4.7 MMOL/L (ref 3.5–5.2)
RBC # BLD AUTO: 4.67 10*6/MM3 (ref 3.77–5.28)
SODIUM SERPL-SCNC: 142 MMOL/L (ref 136–145)
T3RU NFR SERPL: 20 % (ref 24–39)
T4 SERPL-MCNC: 10.5 UG/DL (ref 4.5–12)
TRIGL SERPL-MCNC: 96 MG/DL (ref 0–150)
TSH SERPL DL<=0.005 MIU/L-ACNC: 4.44 UIU/ML (ref 0.45–4.5)
VLDLC SERPL CALC-MCNC: 18 MG/DL (ref 5–40)
WBC # BLD AUTO: 7.48 10*3/MM3 (ref 3.4–10.8)

## 2024-08-14 ENCOUNTER — OFFICE VISIT (OUTPATIENT)
Dept: FAMILY MEDICINE CLINIC | Facility: CLINIC | Age: 24
End: 2024-08-14
Payer: COMMERCIAL

## 2024-08-14 VITALS
DIASTOLIC BLOOD PRESSURE: 76 MMHG | HEART RATE: 68 BPM | SYSTOLIC BLOOD PRESSURE: 112 MMHG | BODY MASS INDEX: 28.13 KG/M2 | OXYGEN SATURATION: 99 % | WEIGHT: 164.8 LBS | HEIGHT: 64 IN

## 2024-08-14 DIAGNOSIS — B35.9 TINEA: Primary | ICD-10-CM

## 2024-08-14 PROCEDURE — 99213 OFFICE O/P EST LOW 20 MIN: CPT | Performed by: NURSE PRACTITIONER

## 2024-08-14 RX ORDER — CLOTRIMAZOLE 1 %
1 CREAM (GRAM) TOPICAL 2 TIMES DAILY
Qty: 60 G | Refills: 5 | Status: SHIPPED | OUTPATIENT
Start: 2024-08-14

## 2024-08-14 NOTE — PROGRESS NOTES
Subjective   Kary Kern is a 24 y.o. female.     Chief Complaint   Patient presents with   • Rash     Left upper Thight x's 4 weeks   Small area         Rash   She has had this for 3 weeks.  She is working at [employer]    The following portions of the patient's history were reviewed and updated as appropriate: allergies, current medications, past family history, past medical history, past social history, past surgical history and problem list.    Review of Systems   Denies dizziness, fatigue, fever/chills, CP, SOA, headache, blood in urine/stool, abd pain, leg swelling.    Objective     Vitals:    08/14/24 0855   BP: 112/76   Pulse: 68   SpO2: 99%      Body mass index is 28.27 kg/m².    Physical Exam  Constitutional:       Appearance: She is normal weight.   HENT:      Head: Normocephalic and atraumatic.      Nose: Nose normal.      Mouth/Throat:      Mouth: Mucous membranes are moist.   Eyes:      Pupils: Pupils are equal, round, and reactive to light.   Cardiovascular:      Rate and Rhythm: Normal rate and regular rhythm.      Pulses: Normal pulses.      Heart sounds: Normal heart sounds.   Pulmonary:      Effort: Pulmonary effort is normal.      Breath sounds: Normal breath sounds.   Abdominal:      General: Abdomen is flat.   Musculoskeletal:         General: Normal range of motion.   Skin:     General: Skin is warm and dry.      Findings: Rash present.      Comments: She has a 2 cm area of tinea to the left upper, inner thigh.  It has been present x 3 weeks.   Neurological:      General: No focal deficit present.      Mental Status: She is alert.   Psychiatric:         Mood and Affect: Mood normal.           Assessment & Plan   Diagnoses and all orders for this visit:    1. Tinea (Primary)  -     clotrimazole (LOTRIMIN) 1 % cream; Apply 1 Application topically to the appropriate area as directed 2 (Two) Times a Day.  Dispense: 60 g; Refill: 5        Discussed Care Gaps, ordered referrals and  encouraged vaccination updates.       - Pt agrees with plan of care and denies further questions/concerns today  - This document is intended for medical expert use only. Persons  reading this document without medical staff guidance may result in misinterpretation and unintended morbidity     Go to the ER for any possible life-threatening symptoms such as chest pain or shortness of air.      Please allow 3-5 business days for recommendations based on new results      I personally spent time with this patient, preparing for the visit, reviewing tests, obtaining and/or reviewing a separately obtained history, performing a medically appropriate examination and/or evaluation, counseling and educating the patient/family/caregiver, ordering medications,  documenting information in the medical record and indepentently interpreting results.

## 2024-10-02 DIAGNOSIS — F41.9 ANXIETY: ICD-10-CM

## 2024-10-02 RX ORDER — ESCITALOPRAM OXALATE 10 MG/1
10 TABLET ORAL DAILY
Qty: 90 TABLET | Refills: 0 | Status: SHIPPED | OUTPATIENT
Start: 2024-10-02

## 2025-01-23 ENCOUNTER — OFFICE VISIT (OUTPATIENT)
Dept: FAMILY MEDICINE CLINIC | Facility: CLINIC | Age: 25
End: 2025-01-23
Payer: COMMERCIAL

## 2025-01-23 VITALS
OXYGEN SATURATION: 100 % | BODY MASS INDEX: 28.31 KG/M2 | DIASTOLIC BLOOD PRESSURE: 68 MMHG | HEART RATE: 74 BPM | SYSTOLIC BLOOD PRESSURE: 118 MMHG | HEIGHT: 64 IN | WEIGHT: 165.8 LBS

## 2025-01-23 DIAGNOSIS — J01.90 ACUTE BACTERIAL SINUSITIS: ICD-10-CM

## 2025-01-23 DIAGNOSIS — Z00.00 ANNUAL PHYSICAL EXAM: Primary | ICD-10-CM

## 2025-01-23 DIAGNOSIS — Z13.29 THYROID DISORDER SCREENING: ICD-10-CM

## 2025-01-23 DIAGNOSIS — B96.89 ACUTE BACTERIAL SINUSITIS: ICD-10-CM

## 2025-01-23 DIAGNOSIS — F41.9 ANXIETY: ICD-10-CM

## 2025-01-23 DIAGNOSIS — Z13.220 LIPID SCREENING: ICD-10-CM

## 2025-01-23 RX ORDER — CLINDAMYCIN HYDROCHLORIDE 300 MG/1
300 CAPSULE ORAL 2 TIMES DAILY
Qty: 14 CAPSULE | Refills: 0 | Status: SHIPPED | OUTPATIENT
Start: 2025-01-23

## 2025-01-23 NOTE — PROGRESS NOTES
Subjective   Kary Kern is a 24 y.o. female. Presents today for   Chief Complaint   Patient presents with    Annual Exam    Cough     X's 3 weeks       History Of Present Illness Kary Granados is a 24-year-old female presenting for an annual physical and to discuss her cough and congestion    Anxiety: well controlled off medications  She is  now and working full-time    Care gaps:   COVID  Influenza had September 2024      History of Present Illness  The patient presents for evaluation of a persistent cough, anxiety, and medication management.    She has been experiencing a persistent cough for the past 3 weeks, which she describes as more indicative of an upper respiratory issue rather than a deep-seated problem. The cough is most pronounced at night and early in the morning, often accompanied by significant mucus production. The symptoms tend to subside as the day progresses but recur later in the day. She suspects that the cough may be allergy-related. Despite the severity of the cough, it does not disrupt her sleep.    She has discontinued her anxiety medication, citalopram, and reports an improvement in her anxiety levels following the completion of her school studies.    She has also ceased her birth control regimen due to her recent marriage in 11/2024. She continues to take spironolactone but plans to discontinue it upon confirmation of pregnancy.    SOCIAL HISTORY  She is .    MEDICATIONS  Current: Spironolactone.  Discontinued: Citalopram.    IMMUNIZATIONS  She received the influenza vaccine in September 2024.    Patient Active Problem List   Diagnosis    Encounter for gynecological examination without abnormal finding    Patellar tendinitis of both knees       Social History     Socioeconomic History    Marital status: Single   Tobacco Use    Smoking status: Never    Smokeless tobacco: Never   Vaping Use    Vaping status: Never Used   Substance and Sexual Activity    Alcohol use: Yes  "    Comment: Rare Occ    Drug use: No    Sexual activity: Yes     Partners: Male     Birth control/protection: None, OCP       Allergies   Allergen Reactions    Amoxicillin Rash       Current Outpatient Medications on File Prior to Visit   Medication Sig Dispense Refill    spironolactone (ALDACTONE) 25 MG tablet Take 3 tablets by mouth Daily. 90 tablet 11    [DISCONTINUED] clotrimazole (LOTRIMIN) 1 % cream Apply 1 Application topically to the appropriate area as directed 2 (Two) Times a Day. (Patient not taking: Reported on 1/23/2025) 60 g 5    [DISCONTINUED] escitalopram (Lexapro) 10 MG tablet Take 1 tablet by mouth Daily. (Patient not taking: Reported on 1/23/2025) 90 tablet 0    [DISCONTINUED] Levonorgest-Eth Estrad 91-Day (Simpesse) 0.15-0.03 &0.01 MG Take 1 tablet by mouth Daily. (Patient not taking: Reported on 1/23/2025) 91 each 4    [DISCONTINUED] spironolactone (ALDACTONE) 25 MG tablet Take 3 tablets by mouth Every Evening with a meal (Patient not taking: Reported on 1/23/2025) 90 tablet 5     No current facility-administered medications on file prior to visit.       Objective   Vitals:    01/23/25 0850   BP: 118/68   Pulse: 74   SpO2: 100%   Weight: 75.2 kg (165 lb 12.8 oz)   Height: 162.6 cm (64.02\")     Body mass index is 28.44 kg/m².    Physical Exam    Physical Exam  Constitutional:       Appearance: She is obese.   HENT:      Head: Normocephalic and atraumatic.      Mouth/Throat:      Mouth: Mucous membranes are moist.   Eyes:      Pupils: Pupils are equal, round, and reactive to light.   Cardiovascular:      Rate and Rhythm: Normal rate and regular rhythm.      Pulses: Normal pulses.      Heart sounds: Normal heart sounds.   Pulmonary:      Effort: Pulmonary effort is normal.      Breath sounds: Normal breath sounds.   Abdominal:      General: Bowel sounds are normal.   Musculoskeletal:         General: Normal range of motion.   Skin:     General: Skin is warm and dry.      Capillary Refill: " Capillary refill takes less than 2 seconds.   Neurological:      General: No focal deficit present.      Mental Status: She is alert.   Psychiatric:         Mood and Affect: Mood normal.         Results         Procedures     Assessment & Plan   Diagnoses and all orders for this visit:    1. Annual physical exam (Primary)  -     CBC & Differential  -     Comprehensive Metabolic Panel    2. Anxiety    3. Acute bacterial sinusitis  -     clindamycin (Cleocin) 300 MG capsule; Take 1 capsule by mouth 2 (Two) Times a Day.  Dispense: 14 capsule; Refill: 0    4. Lipid screening  -     Lipid Panel    5. Thyroid disorder screening  -     Thyroid Panel With TSH         Assessment & Plan  1. Persistent cough.  She has been experiencing a cough for the past 3 weeks, primarily at night and in the morning, with mucus production. The cough appears to be upper respiratory in nature and may be related to allergies. An antibiotic will be prescribed to address the cough. The prescription will be sent to [facility].    2. Anxiety.  She has stopped taking her anxiety medication, citalopram, and reports that her anxiety has improved since finishing school and starting a new job. No further anxiety medication is needed at this time.    3. Medication management.  She has discontinued her birth control medication as she is now  and not actively preventing pregnancy. She is currently taking spironolactone and plans to stop if she becomes pregnant.    4. Health maintenance.  Her blood pressure is normal. She received her influenza vaccine in September 2024. She is unsure about her COVID-19 vaccination status. Laboratory tests will be conducted to monitor her thyroid function. She has fasted in preparation for these tests.    Follow-up  The patient will follow up in 6 months.  Diagnoses and all orders for this visit:    1. Annual physical exam (Primary)  -     CBC & Differential  -     Comprehensive Metabolic Panel    2.  Anxiety    3. Acute bacterial sinusitis  -     clindamycin (Cleocin) 300 MG capsule; Take 1 capsule by mouth 2 (Two) Times a Day.  Dispense: 14 capsule; Refill: 0    4. Lipid screening  -     Lipid Panel    5. Thyroid disorder screening  -     Thyroid Panel With TSH             Body mass index is 28.44 kg/m².    Discussed Care Gaps, ordered referrals and encouraged vaccination updates.       - Pt agrees with plan of care and denies further questions/concerns today  - This document is intended for medical expert use only. Persons  reading this document without medical staff guidance may result in misinterpretation and unintended morbidity     Go to the ER for any possible life-threatening symptoms such as chest pain or shortness of air.      Please allow 3-5 business days for recommendations based on new results      I personally spent time with this patient, preparing for the visit, reviewing tests, obtaining and/or reviewing a separately obtained history, performing a medically appropriate examination and/or evaluation, counseling and educating the patient/family/caregiver, ordering medications,  documenting information in the medical record and indepentently interpreting results.             Return in about 6 months (around 7/23/2025) for Next scheduled follow up.     Patient or patient representative verbalized consent for the use of Ambient Listening during the visit with  ADENIKE Mcintosh for chart documentation. 1/23/2025  09:44 EST

## 2025-01-24 LAB
ALBUMIN SERPL-MCNC: 4.3 G/DL (ref 3.5–5.2)
ALBUMIN/GLOB SERPL: 1.5 G/DL
ALP SERPL-CCNC: 69 U/L (ref 39–117)
ALT SERPL-CCNC: 46 U/L (ref 1–33)
AST SERPL-CCNC: 35 U/L (ref 1–32)
BASOPHILS # BLD AUTO: 0.04 10*3/MM3 (ref 0–0.2)
BASOPHILS NFR BLD AUTO: 0.5 % (ref 0–1.5)
BILIRUB SERPL-MCNC: 0.4 MG/DL (ref 0–1.2)
BUN SERPL-MCNC: 8 MG/DL (ref 6–20)
BUN/CREAT SERPL: 10.8 (ref 7–25)
CALCIUM SERPL-MCNC: 9.6 MG/DL (ref 8.6–10.5)
CHLORIDE SERPL-SCNC: 101 MMOL/L (ref 98–107)
CHOLEST SERPL-MCNC: 148 MG/DL (ref 0–200)
CO2 SERPL-SCNC: 25 MMOL/L (ref 22–29)
CREAT SERPL-MCNC: 0.74 MG/DL (ref 0.57–1)
EGFRCR SERPLBLD CKD-EPI 2021: 116 ML/MIN/1.73
EOSINOPHIL # BLD AUTO: 0.12 10*3/MM3 (ref 0–0.4)
EOSINOPHIL NFR BLD AUTO: 1.6 % (ref 0.3–6.2)
ERYTHROCYTE [DISTWIDTH] IN BLOOD BY AUTOMATED COUNT: 11.9 % (ref 12.3–15.4)
FT4I SERPL CALC-MCNC: 1.9 (ref 1.2–4.9)
GLOBULIN SER CALC-MCNC: 2.8 GM/DL
GLUCOSE SERPL-MCNC: 81 MG/DL (ref 65–99)
HCT VFR BLD AUTO: 40.6 % (ref 34–46.6)
HDLC SERPL-MCNC: 44 MG/DL (ref 40–60)
HGB BLD-MCNC: 13.7 G/DL (ref 12–15.9)
IMM GRANULOCYTES # BLD AUTO: 0.03 10*3/MM3 (ref 0–0.05)
IMM GRANULOCYTES NFR BLD AUTO: 0.4 % (ref 0–0.5)
LDLC SERPL CALC-MCNC: 87 MG/DL (ref 0–100)
LYMPHOCYTES # BLD AUTO: 2.23 10*3/MM3 (ref 0.7–3.1)
LYMPHOCYTES NFR BLD AUTO: 29.6 % (ref 19.6–45.3)
MCH RBC QN AUTO: 30.2 PG (ref 26.6–33)
MCHC RBC AUTO-ENTMCNC: 33.7 G/DL (ref 31.5–35.7)
MCV RBC AUTO: 89.6 FL (ref 79–97)
MONOCYTES # BLD AUTO: 0.53 10*3/MM3 (ref 0.1–0.9)
MONOCYTES NFR BLD AUTO: 7 % (ref 5–12)
NEUTROPHILS # BLD AUTO: 4.58 10*3/MM3 (ref 1.7–7)
NEUTROPHILS NFR BLD AUTO: 60.9 % (ref 42.7–76)
NRBC BLD AUTO-RTO: 0 /100 WBC (ref 0–0.2)
PLATELET # BLD AUTO: 318 10*3/MM3 (ref 140–450)
POTASSIUM SERPL-SCNC: 4.7 MMOL/L (ref 3.5–5.2)
PROT SERPL-MCNC: 7.1 G/DL (ref 6–8.5)
RBC # BLD AUTO: 4.53 10*6/MM3 (ref 3.77–5.28)
SODIUM SERPL-SCNC: 137 MMOL/L (ref 136–145)
T3RU NFR SERPL: 26 % (ref 24–39)
T4 SERPL-MCNC: 7.2 UG/DL (ref 4.5–12)
TRIGL SERPL-MCNC: 88 MG/DL (ref 0–150)
TSH SERPL DL<=0.005 MIU/L-ACNC: 1.11 UIU/ML (ref 0.45–4.5)
VLDLC SERPL CALC-MCNC: 17 MG/DL (ref 5–40)
WBC # BLD AUTO: 7.53 10*3/MM3 (ref 3.4–10.8)

## 2025-01-24 NOTE — PROGRESS NOTES
Your kidney and liver functions are fine at this time.  Your thyroid function is fine at this time.  Your cholesterol levels are fine at this time.

## 2025-04-16 ENCOUNTER — OFFICE VISIT (OUTPATIENT)
Dept: FAMILY MEDICINE CLINIC | Facility: CLINIC | Age: 25
End: 2025-04-16
Payer: COMMERCIAL

## 2025-04-16 VITALS
DIASTOLIC BLOOD PRESSURE: 68 MMHG | WEIGHT: 160.6 LBS | TEMPERATURE: 98.7 F | OXYGEN SATURATION: 100 % | HEIGHT: 64 IN | HEART RATE: 64 BPM | SYSTOLIC BLOOD PRESSURE: 108 MMHG | BODY MASS INDEX: 27.42 KG/M2

## 2025-04-16 DIAGNOSIS — J02.9 ACUTE PHARYNGITIS, UNSPECIFIED ETIOLOGY: Primary | ICD-10-CM

## 2025-04-16 DIAGNOSIS — J02.9 SORE THROAT: ICD-10-CM

## 2025-04-16 DIAGNOSIS — R05.9 COUGH, UNSPECIFIED TYPE: ICD-10-CM

## 2025-04-16 PROBLEM — Z01.419 ENCOUNTER FOR GYNECOLOGICAL EXAMINATION WITHOUT ABNORMAL FINDING: Status: RESOLVED | Noted: 2017-04-11 | Resolved: 2025-04-16

## 2025-04-16 LAB
EXPIRATION DATE: NORMAL
INTERNAL CONTROL: NORMAL
Lab: NORMAL
S PYO AG THROAT QL: NEGATIVE

## 2025-04-16 PROCEDURE — 87880 STREP A ASSAY W/OPTIC: CPT

## 2025-04-16 PROCEDURE — 99213 OFFICE O/P EST LOW 20 MIN: CPT

## 2025-04-16 NOTE — PROGRESS NOTES
Subjective   Kary Kern is a 24 y.o. female.     Patient or patient representative verbalized consent for the use of Ambient Listening during the visit with  ADENIKE Toussaint for chart documentation. 4/16/2025  16:40 EDT    Chief Complaint   Patient presents with    Sore Throat     X 2 days    Cough    Nasal Congestion     X 2 days       History of Present Illness  The patient is a 24-year-old female who presents for evaluation of cough, sore throat, and sinus congestion.    Cough, Sore Throat, and Sinus Congestion  Symptoms began approximately 2 days ago, including a productive cough with dark yellowish-brown sputum, mild hoarseness, nasal congestion, body aches, fatigue, and rhinorrhea. The cough intensifies during the night. Mild sinus pressure and pain behind the eyes are reported. Nausea was experienced yesterday, but there have been no episodes of vomiting or diarrhea. No changes in bowel movements are noted. Participation in a 6-week diet challenge has increased her appetite, and fluid intake has been augmented. No shortness of breath or difficulty breathing is reported. There has been no known exposure to sick individuals. COVID-19 testing has not been performed. Over-the-counter cold and sinus medication has been used for symptom management. She is not currently taking any antihistamines or Flonase but has Zyrtec and Flonase at home.  - Onset: Symptoms began approximately 2 days ago.  - Location: Sinus congestion, pain behind the eyes.  - Duration: Persistent for the past 2 days.  - Character: Productive cough with dark yellowish-brown sputum, mild hoarseness, nasal congestion, body aches, fatigue, rhinorrhea, mild sinus pressure, and pain behind the eyes.  - Alleviating/Aggravating Factors: Over-the-counter cold and sinus medication used for symptom management; cough intensifies during the night.  - Timing: Cough intensifies during the night.  - Severity: Mild hoarseness, mild sinus pressure, no  shortness of breath or difficulty breathing.    Severe Sore Throat and History of Streptococcal Infections  A severe sore throat and redness are reported, with a history of streptococcal infections. Tonsils are significantly enlarged. The last episode of strep throat occurred at least 5 years ago during high school. No work documentation is required.  - Onset: Severe sore throat reported recently.  - Location: Throat, tonsils.  - Duration: Last episode of strep throat occurred at least 5 years ago.  - Character: Severe sore throat, redness, significantly enlarged tonsils.  - Severity: Severe sore throat.    Current medication includes spironolactone, with refills managed by her dermatologist. Clindamycin has been discontinued.    SOCIAL HISTORY  - Works as an orthopedic PA       The following portions of the patient's history were reviewed and updated as appropriate: allergies, current medications, past family history, past medical history, past social history, past surgical history and problem list.    Results  - Labs:    - Strep test: Negative       Objective     Vitals:    04/16/25 1540   BP: 108/68   Pulse: 64   Temp: 98.7 °F (37.1 °C)   SpO2: 100%      Body mass index is 27.55 kg/m².    Physical Exam  Vitals reviewed.   Constitutional:       Appearance: Normal appearance.   HENT:      Right Ear: Tympanic membrane, ear canal and external ear normal.      Left Ear: Tympanic membrane, ear canal and external ear normal.      Nose: Congestion present.      Right Sinus: Maxillary sinus tenderness present.      Left Sinus: Maxillary sinus tenderness present.      Mouth/Throat:      Mouth: Mucous membranes are moist.      Pharynx: Oropharynx is clear. Posterior oropharyngeal erythema present. No oropharyngeal exudate.   Eyes:      Conjunctiva/sclera: Conjunctivae normal.   Cardiovascular:      Rate and Rhythm: Normal rate and regular rhythm.   Pulmonary:      Effort: Pulmonary effort is normal.      Breath sounds:  Normal breath sounds.   Skin:     General: Skin is warm and dry.   Neurological:      Mental Status: She is alert and oriented to person, place, and time.   Psychiatric:         Behavior: Behavior normal.       Assessment & Plan  1. Pharyngitis  - Symptoms: sore throat, cough, congestion, body aches without fever  - Physical exam: redness and swelling in the throat, tender lymph nodes  - Strep test: negative  - History of seasonal allergies  - Start antihistamine like Zyrtec for postnasal drip  - Over-the-counter remedies: Cepacol numbing lozenges, Chloraseptic numbing spray, hot teas with honey for symptomatic relief  - Contact via [employer] message if symptoms worsen or significant progression occurs    2. Sinus congestion  - Reports: sinus pressure and pain, particularly behind the eyes  - Physical exam: tenderness in the sinuses  - Use Flonase if available at home  - Increase fluid intake       Discussed Care Gaps, ordered referrals and encouraged vaccination updates.       - Pt agrees with plan of care and denies further questions/concerns today  - This document is intended for medical expert use only. Persons  reading this document without medical staff guidance may result in misinterpretation and unintended morbidity     Go to the ER for any possible life-threatening symptoms such as chest pain or shortness of air.      Please allow 3-5 business days for recommendations based on new results      I personally spent time with this patient, preparing for the visit, reviewing tests, obtaining and/or reviewing a separately obtained history, performing a medically appropriate examination and/or evaluation, counseling and educating the patient/family/caregiver, ordering medications,  documenting information in the medical record and indepentently interpreting results.

## 2025-04-16 NOTE — PATIENT INSTRUCTIONS

## 2025-04-23 ENCOUNTER — TELEPHONE (OUTPATIENT)
Dept: FAMILY MEDICINE CLINIC | Facility: CLINIC | Age: 25
End: 2025-04-23

## 2025-04-23 DIAGNOSIS — J01.00 ACUTE NON-RECURRENT MAXILLARY SINUSITIS: Primary | ICD-10-CM

## 2025-04-23 RX ORDER — CLINDAMYCIN HYDROCHLORIDE 300 MG/1
300 CAPSULE ORAL 3 TIMES DAILY
Qty: 21 CAPSULE | Refills: 0 | Status: SHIPPED | OUTPATIENT
Start: 2025-04-23

## 2025-04-23 RX ORDER — FLUCONAZOLE 150 MG/1
150 TABLET ORAL ONCE
Qty: 1 TABLET | Refills: 0 | Status: SHIPPED | OUTPATIENT
Start: 2025-04-23 | End: 2025-04-24

## 2025-04-23 NOTE — TELEPHONE ENCOUNTER
Caller: Kary Kern    Relationship: Self    Best call back number: [phone]     What medication are you requesting:      What are your current symptoms:      How long have you been experiencing symptoms:      Have you had these symptoms before:    [] Yes  [] No    Have you been treated for these symptoms before:   [] Yes  [] No    If a prescription is needed, what is your preferred pharmacy and phone number: [facility] [facility] [address] [facility] [phone] [facility] [phone]      Additional notes: PATIENT CALLED SHE WAS LAST SEEN ON 4/16/25 SAW ADENIKE CAGE FOR COUGH, CONGESTION.  SHE STATES SHE IS NOT GETTING ANY BETTER AND WANTS TO KNOW OF ADENIKE STOLL OR ADENIKE CAGE CAN CALL IN ANTIBIOTIC FOR HER.

## 2025-04-23 NOTE — TELEPHONE ENCOUNTER
Antibiotics have been sent clindamycin with antibiotic of choice also sent in Diflucan in case she gets a yeast infection from the antibiotic

## 2025-05-30 ENCOUNTER — OFFICE VISIT (OUTPATIENT)
Dept: OBSTETRICS AND GYNECOLOGY | Facility: CLINIC | Age: 25
End: 2025-05-30
Payer: COMMERCIAL

## 2025-05-30 VITALS
WEIGHT: 153 LBS | BODY MASS INDEX: 26.12 KG/M2 | SYSTOLIC BLOOD PRESSURE: 111 MMHG | DIASTOLIC BLOOD PRESSURE: 74 MMHG | HEIGHT: 64 IN

## 2025-05-30 DIAGNOSIS — Z01.419 WOMEN'S ANNUAL ROUTINE GYNECOLOGICAL EXAMINATION: Primary | ICD-10-CM

## 2025-05-30 PROCEDURE — 99395 PREV VISIT EST AGE 18-39: CPT | Performed by: NURSE PRACTITIONER

## 2025-05-30 NOTE — PROGRESS NOTES
GYN Annual Exam     Chief Complaint   Patient presents with    Gynecologic Exam     Pt here today for AE     HPI    Kary Kern is a 24 y.o. female who presents for annual well woman exam.  She is sexually active. Periods are irregular since stopping ZABRINA, lasting 7 days. Stopped ZABRINA in the last 2 months. No intermenstrual bleeding, spotting, or discharge. Performing SBE:occasionally. She is a patient of Dr. Cantu's.     OB History          0    Para   0    Term   0       0    AB   0    Living   0         SAB   0    IAB   0    Ectopic   0    Molar        Multiple   0    Live Births                  LMP- 25  Current contraception: none  Last Pap-  NIL  History of abnormal Pap smear: no  History of STD-denies  Family history of uterine, colon or ovarian cancer: no  Family history of breast cancer: yes - great maternal aunt  Gardasil Vaccine: completed    Past Medical History:   Diagnosis Date    Anxiety     Encounter for gynecological examination without abnormal finding 2017    Migraines     Patellar tendinitis of both knees 2016       Past Surgical History:   Procedure Laterality Date    KNEE SURGERY           Current Outpatient Medications:     spironolactone (ALDACTONE) 25 MG tablet, Take 3 tablets by mouth Daily., Disp: 90 tablet, Rfl: 11    Allergies   Allergen Reactions    Amoxicillin Rash       Social History     Tobacco Use    Smoking status: Never    Smokeless tobacco: Never   Vaping Use    Vaping status: Never Used   Substance Use Topics    Alcohol use: Yes     Comment: Rare Occ    Drug use: No       Family History   Problem Relation Age of Onset    Migraines Mother     Hypertension Mother     Alcohol abuse Father     Hypertension Father     Parkinsonism Maternal Grandmother     Diabetes Maternal Grandmother     Hypertension Maternal Grandmother     Coronary artery disease Maternal Grandmother     Heart attack Maternal Grandmother     Deep vein thrombosis Maternal  "Grandmother         smoking, in her 60's    Hypertension Maternal Grandfather     Hypertension Paternal Grandmother     Diabetes Paternal Grandfather     Hypertension Paternal Grandfather     Breast cancer Other         fifties    Ovarian cancer Neg Hx     Uterine cancer Neg Hx     Colon cancer Neg Hx     Pulmonary embolism Neg Hx        Review of Systems   Constitutional:  Negative for chills, fatigue and fever.   Gastrointestinal:  Negative for abdominal distention, abdominal pain, nausea and vomiting.   Genitourinary:  Negative for breast discharge, breast lump, breast pain, dysuria, frequency, menstrual problem, pelvic pain, pelvic pressure, urgency, vaginal bleeding, vaginal discharge and vaginal pain.   Musculoskeletal:  Negative for gait problem.   Skin:  Negative for rash.   Neurological:  Negative for dizziness and headache.   Psychiatric/Behavioral:  Negative for behavioral problems.        /74   Ht 162.6 cm (64.02\")   Wt 69.4 kg (153 lb)   LMP 05/09/2025   BMI 26.25 kg/m²     Physical Exam  Constitutional:       General: She is not in acute distress.     Appearance: Normal appearance. She is not ill-appearing, toxic-appearing or diaphoretic.   Genitourinary:      Vulva, bladder and urethral meatus normal.      No lesions in the vagina.      Right Labia: No rash, tenderness, lesions, skin changes or Bartholin's cyst.     Left Labia: No tenderness, lesions, skin changes, Bartholin's cyst or rash.     No labial fusion noted.      No inguinal adenopathy present in the right or left side.     No vaginal discharge, erythema, tenderness, bleeding or ulceration.      No vaginal prolapse present.     No vaginal atrophy present.       Right Adnexa: not tender, not full, not palpable, no mass present and not absent.     Left Adnexa: not tender, not full, not palpable, no mass present and not absent.     No cervical motion tenderness, discharge, friability, lesion, polyp, nabothian cyst or eversion.      " Uterus is not enlarged, fixed, tender, irregular or prolapsed.      No uterine mass detected.     No urethral tenderness or mass present.      Pelvic exam was performed with patient in the lithotomy position.   Breasts:     Breasts are symmetrical.      Right: Present. No swelling, bleeding, inverted nipple, mass, nipple discharge, skin change, tenderness or breast implant.      Left: Present. No swelling, bleeding, inverted nipple, mass, nipple discharge, skin change, tenderness or breast implant.   HENT:      Head: Normocephalic and atraumatic.   Eyes:      Pupils: Pupils are equal, round, and reactive to light.   Cardiovascular:      Rate and Rhythm: Normal rate.   Pulmonary:      Effort: Pulmonary effort is normal.   Abdominal:      General: There is no distension.      Palpations: Abdomen is soft. There is no mass.      Tenderness: There is no abdominal tenderness. There is no guarding.      Hernia: No hernia is present. There is no hernia in the left inguinal area or right inguinal area.   Musculoskeletal:         General: Normal range of motion.      Cervical back: Normal range of motion and neck supple. No tenderness.   Lymphadenopathy:      Cervical: No cervical adenopathy.      Upper Body:      Right upper body: No supraclavicular, axillary or pectoral adenopathy.      Left upper body: No supraclavicular, axillary or pectoral adenopathy.      Lower Body: No right inguinal adenopathy. No left inguinal adenopathy.   Neurological:      General: No focal deficit present.      Mental Status: She is alert and oriented to person, place, and time.      Cranial Nerves: No cranial nerve deficit.   Skin:     General: Skin is warm and dry.   Psychiatric:         Mood and Affect: Mood normal.         Behavior: Behavior normal.         Thought Content: Thought content normal.         Judgment: Judgment normal.   Vitals and nursing note reviewed.       Assessment   Diagnoses and all orders for this visit:    1. Women's  annual routine gynecological examination (Primary)       Plan   Well woman exam: Pap smear up to date. Recommend MVI daily.    Contraception: declines, would be happy with pregnancy. Enc PNV with folic acid.   STD: Enc condoms. Desires STD screen today- No, declines  Smoking status: nonsmoker   Encouraged annual mammogram screening starting at age 40. Instructed on how to perform SBE. Encouraged breast health self awareness.  6.    Encouraged 150 minutes of exercise per week if not medially contraindicated.   7.     Overweight by BMI 26.25    Return in about 1 year (around 5/30/2026) for Annual physical, With Dr Cantu.    Darlyn Dailey, APRN  5/30/2025  11:55 EDT

## 2025-08-21 ENCOUNTER — OFFICE VISIT (OUTPATIENT)
Dept: FAMILY MEDICINE CLINIC | Facility: CLINIC | Age: 25
End: 2025-08-21
Payer: COMMERCIAL

## 2025-08-21 VITALS
OXYGEN SATURATION: 100 % | HEART RATE: 79 BPM | HEIGHT: 64 IN | WEIGHT: 154 LBS | BODY MASS INDEX: 26.29 KG/M2 | SYSTOLIC BLOOD PRESSURE: 98 MMHG | DIASTOLIC BLOOD PRESSURE: 62 MMHG

## 2025-08-21 DIAGNOSIS — Z13.220 LIPID SCREENING: ICD-10-CM

## 2025-08-21 DIAGNOSIS — E03.9 ACQUIRED HYPOTHYROIDISM: Primary | ICD-10-CM

## 2025-08-21 PROCEDURE — 99214 OFFICE O/P EST MOD 30 MIN: CPT | Performed by: NURSE PRACTITIONER

## 2025-08-23 LAB
ALBUMIN SERPL-MCNC: 4.7 G/DL (ref 4–5)
ALP SERPL-CCNC: 80 IU/L (ref 44–121)
ALT SERPL-CCNC: 14 IU/L (ref 0–32)
AST SERPL-CCNC: 20 IU/L (ref 0–40)
BASOPHILS # BLD AUTO: 0.1 X10E3/UL (ref 0–0.2)
BASOPHILS NFR BLD AUTO: 2 %
BILIRUB SERPL-MCNC: 0.3 MG/DL (ref 0–1.2)
BUN SERPL-MCNC: 10 MG/DL (ref 6–20)
BUN/CREAT SERPL: 14 (ref 9–23)
CALCIUM SERPL-MCNC: 9.5 MG/DL (ref 8.7–10.2)
CHLORIDE SERPL-SCNC: 102 MMOL/L (ref 96–106)
CHOLEST SERPL-MCNC: 173 MG/DL (ref 100–199)
CO2 SERPL-SCNC: 24 MMOL/L (ref 20–29)
CREAT SERPL-MCNC: 0.74 MG/DL (ref 0.57–1)
EGFRCR SERPLBLD CKD-EPI 2021: 115 ML/MIN/1.73
EOSINOPHIL # BLD AUTO: 0.1 X10E3/UL (ref 0–0.4)
EOSINOPHIL NFR BLD AUTO: 2 %
ERYTHROCYTE [DISTWIDTH] IN BLOOD BY AUTOMATED COUNT: 11.4 % (ref 11.7–15.4)
FT4I SERPL CALC-MCNC: 1.8 (ref 1.2–4.9)
GLOBULIN SER CALC-MCNC: 2.5 G/DL (ref 1.5–4.5)
GLUCOSE SERPL-MCNC: 66 MG/DL (ref 70–99)
HCT VFR BLD AUTO: 41.3 % (ref 34–46.6)
HDLC SERPL-MCNC: 46 MG/DL
HGB BLD-MCNC: 13.6 G/DL (ref 11.1–15.9)
IMM GRANULOCYTES # BLD AUTO: 0 X10E3/UL (ref 0–0.1)
IMM GRANULOCYTES NFR BLD AUTO: 0 %
LDLC SERPL CALC-MCNC: 101 MG/DL (ref 0–99)
LYMPHOCYTES # BLD AUTO: 2.6 X10E3/UL (ref 0.7–3.1)
LYMPHOCYTES NFR BLD AUTO: 41 %
MCH RBC QN AUTO: 30.2 PG (ref 26.6–33)
MCHC RBC AUTO-ENTMCNC: 32.9 G/DL (ref 31.5–35.7)
MCV RBC AUTO: 92 FL (ref 79–97)
MONOCYTES # BLD AUTO: 0.5 X10E3/UL (ref 0.1–0.9)
MONOCYTES NFR BLD AUTO: 7 %
NEUTROPHILS # BLD AUTO: 3.1 X10E3/UL (ref 1.4–7)
NEUTROPHILS NFR BLD AUTO: 48 %
PLATELET # BLD AUTO: 292 X10E3/UL (ref 150–450)
POTASSIUM SERPL-SCNC: 4.5 MMOL/L (ref 3.5–5.2)
PROT SERPL-MCNC: 7.2 G/DL (ref 6–8.5)
RBC # BLD AUTO: 4.5 X10E6/UL (ref 3.77–5.28)
SODIUM SERPL-SCNC: 141 MMOL/L (ref 134–144)
T3RU NFR SERPL: 26 % (ref 24–39)
T4 SERPL-MCNC: 6.8 UG/DL (ref 4.5–12)
TRIGL SERPL-MCNC: 147 MG/DL (ref 0–149)
TSH SERPL DL<=0.005 MIU/L-ACNC: 2.11 UIU/ML (ref 0.45–4.5)
VLDLC SERPL CALC-MCNC: 26 MG/DL (ref 5–40)
WBC # BLD AUTO: 6.5 X10E3/UL (ref 3.4–10.8)